# Patient Record
Sex: FEMALE | Race: BLACK OR AFRICAN AMERICAN | NOT HISPANIC OR LATINO | Employment: UNEMPLOYED | URBAN - METROPOLITAN AREA
[De-identification: names, ages, dates, MRNs, and addresses within clinical notes are randomized per-mention and may not be internally consistent; named-entity substitution may affect disease eponyms.]

---

## 2020-08-21 ENCOUNTER — HOSPITAL ENCOUNTER (OUTPATIENT)
Dept: LABOR AND DELIVERY | Facility: HOSPITAL | Age: 20
Discharge: HOME | End: 2020-08-21
Attending: OBSTETRICS & GYNECOLOGY

## 2020-08-21 ENCOUNTER — HOSPITAL ENCOUNTER (OUTPATIENT)
Dept: PREADMISSION TESTING | Facility: HOSPITAL | Age: 20
Discharge: HOME | End: 2020-08-21
Attending: OBSTETRICS & GYNECOLOGY

## 2020-08-21 LAB — CONV ALPHA-1-MICROGLOBULIN PLACENTAL (VAGINAL FLUID): NEGATIVE

## 2020-08-22 LAB — SARS-COV-2 RNA SPEC QL NAA+PROBE: NOT DETECTED

## 2020-10-02 LAB — HM PAP SMEAR: NORMAL

## 2021-07-18 PROCEDURE — U0003 INFECTIOUS AGENT DETECTION BY NUCLEIC ACID (DNA OR RNA); SEVERE ACUTE RESPIRATORY SYNDROME CORONAVIRUS 2 (SARS-COV-2) (CORONAVIRUS DISEASE [COVID-19]), AMPLIFIED PROBE TECHNIQUE, MAKING USE OF HIGH THROUGHPUT TECHNOLOGIES AS DESCRIBED BY CMS-2020-01-R: HCPCS | Performed by: FAMILY MEDICINE

## 2021-07-20 VITALS — HEART RATE: 104 BPM | SYSTOLIC BLOOD PRESSURE: 129 MMHG | WEIGHT: 209.5 LBS | DIASTOLIC BLOOD PRESSURE: 78 MMHG

## 2021-07-21 ENCOUNTER — TELEPHONE (OUTPATIENT)
Dept: URGENT CARE | Facility: CLINIC | Age: 21
End: 2021-07-21

## 2021-07-21 NOTE — TELEPHONE ENCOUNTER
Attempted telephone call to the patient. Left voicemail requesting call back. The patient is positive for Covid.      12:30 PM patient called back.  She is informed of her test results.  Having mild symptoms, but she is pregnant.  Plan: She will call her obstetrician today for assessment.  Return to [facility] if any problems.

## 2021-08-09 ENCOUNTER — OFFICE VISIT (OUTPATIENT)
Dept: OBSTETRICS AND GYNECOLOGY | Facility: CLINIC | Age: 21
End: 2021-08-09

## 2021-08-09 VITALS
HEIGHT: 61 IN | BODY MASS INDEX: 37.53 KG/M2 | SYSTOLIC BLOOD PRESSURE: 116 MMHG | WEIGHT: 198.8 LBS | TEMPERATURE: 97.2 F | DIASTOLIC BLOOD PRESSURE: 80 MMHG

## 2021-08-09 DIAGNOSIS — Z32.00 ENCOUNTER FOR PREGNANCY TEST, RESULT UNKNOWN: Primary | ICD-10-CM

## 2021-08-09 LAB
B-HCG UR QL: POSITIVE
INTERNAL NEGATIVE CONTROL: POSITIVE
INTERNAL POSITIVE CONTROL: POSITIVE
Lab: ABNORMAL

## 2021-08-09 PROCEDURE — 99213 OFFICE O/P EST LOW 20 MIN: CPT | Performed by: NURSE PRACTITIONER

## 2021-08-09 PROCEDURE — 81025 URINE PREGNANCY TEST: CPT | Performed by: NURSE PRACTITIONER

## 2021-08-09 NOTE — PROGRESS NOTES
Subjective   Meliton Pepe is a 21 y.o. female here for pregnancy confirmation.      History of Present Illness LMP 5/9/21, mild nausea. Cycle was regular before she missed it, not using birth control.       13w1d, EDC 2/13/21    Review of Systems   Constitutional: Negative.    HENT: Negative.    Eyes: Negative.    Respiratory: Negative for cough and shortness of breath.    Cardiovascular: Negative for chest pain and leg swelling.   Gastrointestinal: Negative for abdominal pain.   Endocrine: Negative.    Genitourinary: Negative for breast discharge, breast lump, breast pain, difficulty urinating, dyspareunia, dysuria, menstrual problem, pelvic pain and vaginal discharge.   Musculoskeletal: Negative.    Skin: Negative.    Allergic/Immunologic:        No new allergies.   Neurological: Negative.    Hematological: Negative.    Psychiatric/Behavioral: Negative for depressed mood. The patient is not nervous/anxious.        Objective   Physical Exam - Unable to auscultate T      Assessment/Plan   Diagnoses and all orders for this visit:    1. Encounter for pregnancy test, result unknown (Primary)  -     POC Pregnancy, Urine  -     US Ob < 14 Weeks Single or First Gestation; Future  -     Urine Culture - Urine, Urine, Clean Catch  -     Hepatitis C Antibody  -     CBC & Differential  -     Hepatitis B Surface Antigen  -     HIV-1 / O / 2 Ag / Antibody 4th Generation  -     RPR  -     Type & Screen  -     Urinalysis With Microscopic - Urine, Clean Catch  -     Vitamin D 25 Hydroxy  -     Rubella Antibody, IgG    Other orders  -     Prenatal MV-Min-Fe Fum-FA-DHA (Prenatal Multi +DHA) 27-0.8-200 MG capsule; Take 1 tablet by mouth Daily.  Dispense: 90 capsule; Refill: 4      Return in four weeks.

## 2021-08-20 ENCOUNTER — HOSPITAL ENCOUNTER (OUTPATIENT)
Dept: ULTRASOUND IMAGING | Facility: HOSPITAL | Age: 21
Discharge: HOME | End: 2021-08-20
Admitting: NURSE PRACTITIONER

## 2021-08-20 DIAGNOSIS — Z32.00 ENCOUNTER FOR PREGNANCY TEST, RESULT UNKNOWN: ICD-10-CM

## 2021-08-20 PROCEDURE — 76801 OB US < 14 WKS SINGLE FETUS: CPT

## 2021-09-17 ENCOUNTER — INITIAL PRENATAL (OUTPATIENT)
Dept: OBSTETRICS AND GYNECOLOGY | Facility: CLINIC | Age: 21
End: 2021-09-17

## 2021-09-17 VITALS — BODY MASS INDEX: 38.73 KG/M2 | SYSTOLIC BLOOD PRESSURE: 110 MMHG | WEIGHT: 205 LBS | DIASTOLIC BLOOD PRESSURE: 64 MMHG

## 2021-09-17 DIAGNOSIS — Z98.891 PREVIOUS CESAREAN SECTION: ICD-10-CM

## 2021-09-17 DIAGNOSIS — N91.2 AMENORRHEA: Primary | ICD-10-CM

## 2021-09-17 DIAGNOSIS — Z34.80 SUPERVISION OF OTHER NORMAL PREGNANCY, ANTEPARTUM: ICD-10-CM

## 2021-09-17 LAB
ABO GROUP BLD: NORMAL
B-HCG UR QL: POSITIVE
BASOPHILS # BLD AUTO: 0.02 10*3/MM3 (ref 0–0.2)
BASOPHILS NFR BLD AUTO: 0.1 % (ref 0–1.5)
BLD GP AB SCN SERPL QL: NEGATIVE
C TRACH RRNA SPEC DONR QL NAA+PROBE: NORMAL
DEPRECATED RDW RBC AUTO: 39.4 FL (ref 37–54)
EOSINOPHIL # BLD AUTO: 0.01 10*3/MM3 (ref 0–0.4)
EOSINOPHIL NFR BLD AUTO: 0.1 % (ref 0.3–6.2)
ERYTHROCYTE [DISTWIDTH] IN BLOOD BY AUTOMATED COUNT: 13.8 % (ref 12.3–15.4)
GLUCOSE UR STRIP-MCNC: NEGATIVE MG/DL
HBV SURFACE AG SERPL QL IA: NORMAL
HCT VFR BLD AUTO: 34.8 % (ref 34–46.6)
HCV AB SER DONR QL: NORMAL
HGB BLD-MCNC: 11.4 G/DL (ref 12–15.9)
IMM GRANULOCYTES # BLD AUTO: 0.06 10*3/MM3 (ref 0–0.05)
IMM GRANULOCYTES NFR BLD AUTO: 0.4 % (ref 0–0.5)
INTERNAL NEGATIVE CONTROL: ABNORMAL
INTERNAL POSITIVE CONTROL: POSITIVE
LYMPHOCYTES # BLD AUTO: 2.44 10*3/MM3 (ref 0.7–3.1)
LYMPHOCYTES NFR BLD AUTO: 18.2 % (ref 19.6–45.3)
Lab: ABNORMAL
MCH RBC QN AUTO: 26 PG (ref 26.6–33)
MCHC RBC AUTO-ENTMCNC: 32.8 G/DL (ref 31.5–35.7)
MCV RBC AUTO: 79.3 FL (ref 79–97)
MONOCYTES # BLD AUTO: 1.14 10*3/MM3 (ref 0.1–0.9)
MONOCYTES NFR BLD AUTO: 8.5 % (ref 5–12)
N GONORRHOEA DNA SPEC QL NAA+PROBE: NORMAL
NEUTROPHILS NFR BLD AUTO: 72.7 % (ref 42.7–76)
NEUTROPHILS NFR BLD AUTO: 9.72 10*3/MM3 (ref 1.7–7)
NRBC BLD AUTO-RTO: 0 /100 WBC (ref 0–0.2)
PLATELET # BLD AUTO: 258 10*3/MM3 (ref 140–450)
PMV BLD AUTO: 10.9 FL (ref 6–12)
PROT UR STRIP-MCNC: NEGATIVE MG/DL
RBC # BLD AUTO: 4.39 10*6/MM3 (ref 3.77–5.28)
RH BLD: POSITIVE
WBC # BLD AUTO: 13.39 10*3/MM3 (ref 3.4–10.8)

## 2021-09-17 PROCEDURE — 87086 URINE CULTURE/COLONY COUNT: CPT | Performed by: ADVANCED PRACTICE MIDWIFE

## 2021-09-17 PROCEDURE — 99214 OFFICE O/P EST MOD 30 MIN: CPT | Performed by: ADVANCED PRACTICE MIDWIFE

## 2021-09-17 PROCEDURE — G0432 EIA HIV-1/HIV-2 SCREEN: HCPCS | Performed by: ADVANCED PRACTICE MIDWIFE

## 2021-09-17 PROCEDURE — 88175 CYTOPATH C/V AUTO FLUID REDO: CPT

## 2021-09-17 PROCEDURE — 87624 HPV HI-RISK TYP POOLED RSLT: CPT | Performed by: ADVANCED PRACTICE MIDWIFE

## 2021-09-17 PROCEDURE — 87591 N.GONORRHOEAE DNA AMP PROB: CPT

## 2021-09-17 PROCEDURE — 87661 TRICHOMONAS VAGINALIS AMPLIF: CPT

## 2021-09-17 PROCEDURE — 87491 CHLMYD TRACH DNA AMP PROBE: CPT

## 2021-09-17 PROCEDURE — 86803 HEPATITIS C AB TEST: CPT | Performed by: ADVANCED PRACTICE MIDWIFE

## 2021-09-17 PROCEDURE — 81025 URINE PREGNANCY TEST: CPT | Performed by: ADVANCED PRACTICE MIDWIFE

## 2021-09-17 NOTE — PROGRESS NOTES
OB Initial Visit    CC- Here for care of current pregnancy     PINEDA Finalized: Due date finalized: 21    Subjective:  21 y.o.  presenting for her first obstetrical visit.    LMP: Patient's last menstrual period was 2021 (exact date). sure    COMPLAINS OF: Pt has no complaints, is doing well        Reviewed and updated:  OBHx, GYNHx (STDs), PMHx, Medications, Allergies, PSHx, Social Hx, Preventative Hx (PAP), Hx of abuse/safe environment, Vaccine Hx including hx of chickenpox or vaccine, Genetic Hx (pt, FOB, both families).        Objective:  /64   Wt 93 kg (205 lb)   LMP 2021 (Exact Date)   BMI 38.73 kg/m²   General- NAD, alert and oriented, appropriate  Psych- Normal mood, good memory  Neck- No masses, no thyroid enlargement  CV- Regular rhythm, no murnurs  Resp- CTA to bases, no wheezes  Abdomen- Soft, non distended, non tender, no masses     Breast left- No mass, non tender, no nipple discharge  Breast right- No mass, non tender, no nipple discharge    External genitalia- Normal, no lesions  Urethra- Normal, no masses, non tender  Vagina- Normal, no discharge  Bladder- Normal, no masses, non tender  Cvx- Normal, no lesions, no discharge, no CMT  Uterus- Consistent with dates  Adnexa- Normal, no mass, non tender    Lymphatic- No palpable neck, axillary, or groin nodes  Ext- No edema, no cyanosis    Skin- No lesions, no rashes, no acanthosis nigricans      Assessment and Plan:  Diagnoses and all orders for this visit:    1. Amenorrhea (Primary)  -     POC Pregnancy, Urine    2. Supervision of other normal pregnancy, antepartum  -     POC Urinalysis Dipstick  -     OB Panel With HIV  -     IGP,CtNgTv,rfx Aptima HPV ASCU  -     Urine Culture - Urine, Urine, Clean Catch  -     US Ob Detail Fetal Anatomy Single or First Gestation; Future    3. Previous  section            18w5d    Genetic Screening: Declines    Counseling: Nutrition discussed, calories, activity/exercise in  pregnancy, Discussed dietary restrictions/safety food preparation in pregnancy, Reviewed what to expect prenatal visits, office providers, Appropriate trimester precautions provided, N/V, vag bleeding, cramping, Questions answered    Labs: Prenatal labs, cultures, and PAP performed (prn)    Return in about 2 weeks (around 10/1/2021) for Anatomy /S.      Roxi Thompson CNM  09/17/2021

## 2021-09-18 LAB
BACTERIA SPEC AEROBE CULT: NO GROWTH
HIV1+2 AB SER QL: NORMAL
RPR SER QL: NORMAL
RUBV IGG SERPL IA-ACNC: 16.1 INDEX

## 2021-09-20 ENCOUNTER — TELEPHONE (OUTPATIENT)
Dept: LABOR AND DELIVERY | Facility: HOSPITAL | Age: 21
End: 2021-09-20

## 2021-09-24 LAB
C TRACH RRNA CVX QL NAA+PROBE: NEGATIVE
CONV .: ABNORMAL
CYTOLOGIST CVX/VAG CYTO: ABNORMAL
CYTOLOGY CVX/VAG DOC CYTO: ABNORMAL
CYTOLOGY CVX/VAG DOC THIN PREP: ABNORMAL
DX ICD CODE: ABNORMAL
DX ICD CODE: ABNORMAL
HIV 1 & 2 AB SER-IMP: ABNORMAL
HPV I/H RISK 4 DNA CVX QL PROBE+SIG AMP: POSITIVE
N GONORRHOEA RRNA CVX QL NAA+PROBE: NEGATIVE
OTHER STN SPEC: ABNORMAL
PATHOLOGIST CVX/VAG CYTO: ABNORMAL
STAT OF ADQ CVX/VAG CYTO-IMP: ABNORMAL
T VAGINALIS RRNA SPEC QL NAA+PROBE: NEGATIVE

## 2021-09-30 PROBLEM — Z34.80 SUPERVISION OF OTHER NORMAL PREGNANCY, ANTEPARTUM: Status: ACTIVE | Noted: 2021-09-30

## 2021-10-06 ENCOUNTER — DOCUMENTATION (OUTPATIENT)
Dept: OBSTETRICS AND GYNECOLOGY | Facility: CLINIC | Age: 21
End: 2021-10-06

## 2021-10-15 ENCOUNTER — TELEPHONE (OUTPATIENT)
Dept: OBSTETRICS AND GYNECOLOGY | Facility: CLINIC | Age: 21
End: 2021-10-15

## 2021-10-15 ENCOUNTER — HOSPITAL ENCOUNTER (OUTPATIENT)
Dept: ULTRASOUND IMAGING | Facility: HOSPITAL | Age: 21
Discharge: HOME | End: 2021-10-15
Admitting: ADVANCED PRACTICE MIDWIFE

## 2021-10-15 DIAGNOSIS — Z34.80 SUPERVISION OF OTHER NORMAL PREGNANCY, ANTEPARTUM: ICD-10-CM

## 2021-10-15 PROCEDURE — 76811 OB US DETAILED SNGL FETUS: CPT

## 2021-10-20 ENCOUNTER — TELEPHONE (OUTPATIENT)
Dept: LABOR AND DELIVERY | Facility: HOSPITAL | Age: 21
End: 2021-10-20

## 2021-10-20 NOTE — TELEPHONE ENCOUNTER
Called patient to introduce Mother[facility] Program and offered participation. Patient declined.

## 2021-11-03 ENCOUNTER — PREP FOR SURGERY (OUTPATIENT)
Dept: OTHER | Facility: HOSPITAL | Age: 21
End: 2021-11-03

## 2021-11-03 ENCOUNTER — ROUTINE PRENATAL (OUTPATIENT)
Dept: OBSTETRICS AND GYNECOLOGY | Facility: CLINIC | Age: 21
End: 2021-11-03

## 2021-11-03 VITALS — WEIGHT: 217.6 LBS | SYSTOLIC BLOOD PRESSURE: 114 MMHG | DIASTOLIC BLOOD PRESSURE: 75 MMHG | BODY MASS INDEX: 41.12 KG/M2

## 2021-11-03 DIAGNOSIS — O34.219 PREVIOUS CESAREAN DELIVERY, ANTEPARTUM: Primary | ICD-10-CM

## 2021-11-03 DIAGNOSIS — Z34.80 SUPERVISION OF OTHER NORMAL PREGNANCY, ANTEPARTUM: ICD-10-CM

## 2021-11-03 LAB
GLUCOSE UR STRIP-MCNC: NEGATIVE MG/DL
RBC # UR STRIP: NEGATIVE /UL

## 2021-11-03 PROCEDURE — 99213 OFFICE O/P EST LOW 20 MIN: CPT | Performed by: OBSTETRICS & GYNECOLOGY

## 2021-11-03 NOTE — PROGRESS NOTES
OB FOLLOW UP    CC: Scheduled OB routine FU       Prenatal care complicated by:   Patient Active Problem List   Diagnosis   • Supervision of other normal pregnancy, antepartum   • Previous  delivery, antepartum   • Obesity affecting pregnancy in second trimester       Subjective:   Patient has: No complaints, No leaking fluid, No vaginal bleeding, No contractions, Adequate FM    Objective:  Urine glucose/protein- see flow sheet      /75   Wt 98.7 kg (217 lb 9.6 oz)   LMP 2021 (Exact Date)   BMI 41.12 kg/m²   See OB flow for LE edema, and cvx exam if applicable  FHT: 147 BPM   Uterine Size: 26 cm      Assessment and Plan:  Diagnoses and all orders for this visit:    1. Previous  delivery, antepartum (Primary)  Overview:  Repeat c/s 2/10/22      2. Supervision of other normal pregnancy, antepartum  Overview:  EDC 21    Initial prenatal care at [facility]    Previous  delivery-repeat  delivery 2/10/2022   obesity    Covid vaccine-recommended  Flu vaccine-recommended  Tdap vaccine-    Assessment & Plan:  Continue prenatal vitamins  The patient reports that she cannot stay for her Glucola today.  She wishes to do this at the next office visit.  Discussed we should try to do this prior to 28 weeks of gestation.  Discussed need to choose a pediatrician for the baby.  Fetal kick counts    Orders:  -     POC Urinalysis Dipstick        25w3d  Reassuring pregnancy progress    Counseling: OB precautions, leaking, VB, curry bermeo vs PTL/Labor  FKC    Questions answered    Return in about 2 weeks (around 2021) for Recheck.      Janak Gross MD  2021

## 2021-11-04 ENCOUNTER — TELEPHONE (OUTPATIENT)
Dept: OBSTETRICS AND GYNECOLOGY | Facility: CLINIC | Age: 21
End: 2021-11-04

## 2021-11-05 PROBLEM — O99.212 OBESITY AFFECTING PREGNANCY IN SECOND TRIMESTER: Status: ACTIVE | Noted: 2021-11-05

## 2021-11-05 NOTE — ASSESSMENT & PLAN NOTE
Continue prenatal vitamins  The patient reports that she cannot stay for her Glucola today.  She wishes to do this at the next office visit.  Discussed we should try to do this prior to 28 weeks of gestation.  Discussed need to choose a pediatrician for the baby.  Fetal kick counts

## 2021-11-18 ENCOUNTER — ROUTINE PRENATAL (OUTPATIENT)
Dept: OBSTETRICS AND GYNECOLOGY | Facility: CLINIC | Age: 21
End: 2021-11-18

## 2021-11-18 VITALS — BODY MASS INDEX: 40.7 KG/M2 | SYSTOLIC BLOOD PRESSURE: 135 MMHG | WEIGHT: 215.4 LBS | DIASTOLIC BLOOD PRESSURE: 64 MMHG

## 2021-11-18 DIAGNOSIS — Z34.80 SUPERVISION OF OTHER NORMAL PREGNANCY, ANTEPARTUM: Primary | ICD-10-CM

## 2021-11-18 LAB
25(OH)D3 SERPL-MCNC: 28.1 NG/ML (ref 30–100)
ABO GROUP BLD: NORMAL
BLD GP AB SCN SERPL QL: NEGATIVE
DEPRECATED RDW RBC AUTO: 36.6 FL (ref 37–54)
ERYTHROCYTE [DISTWIDTH] IN BLOOD BY AUTOMATED COUNT: 13.3 % (ref 12.3–15.4)
GLUCOSE 1H P GLC SERPL-MCNC: 152 MG/DL (ref 65–139)
GLUCOSE UR STRIP-MCNC: NEGATIVE MG/DL
HBV SURFACE AG SERPL QL IA: NORMAL
HCT VFR BLD AUTO: 35.7 % (ref 34–46.6)
HCV AB SER DONR QL: NORMAL
HGB BLD-MCNC: 11.7 G/DL (ref 12–15.9)
HIV1+2 AB SER QL: NORMAL
MCH RBC QN AUTO: 25.2 PG (ref 26.6–33)
MCHC RBC AUTO-ENTMCNC: 32.8 G/DL (ref 31.5–35.7)
MCV RBC AUTO: 76.9 FL (ref 79–97)
PLATELET # BLD AUTO: 231 10*3/MM3 (ref 140–450)
PMV BLD AUTO: 12.2 FL (ref 6–12)
PROT UR STRIP-MCNC: NEGATIVE MG/DL
RBC # BLD AUTO: 4.64 10*6/MM3 (ref 3.77–5.28)
RH BLD: POSITIVE
T&S EXPIRATION DATE: NORMAL
WBC NRBC COR # BLD: 12.15 10*3/MM3 (ref 3.4–10.8)

## 2021-11-18 PROCEDURE — 82306 VITAMIN D 25 HYDROXY: CPT | Performed by: NURSE PRACTITIONER

## 2021-11-18 PROCEDURE — 86850 RBC ANTIBODY SCREEN: CPT | Performed by: NURSE PRACTITIONER

## 2021-11-18 PROCEDURE — 85027 COMPLETE CBC AUTOMATED: CPT | Performed by: OBSTETRICS & GYNECOLOGY

## 2021-11-18 PROCEDURE — 86803 HEPATITIS C AB TEST: CPT | Performed by: NURSE PRACTITIONER

## 2021-11-18 PROCEDURE — 82950 GLUCOSE TEST: CPT | Performed by: OBSTETRICS & GYNECOLOGY

## 2021-11-18 PROCEDURE — 86901 BLOOD TYPING SEROLOGIC RH(D): CPT | Performed by: NURSE PRACTITIONER

## 2021-11-18 PROCEDURE — 99214 OFFICE O/P EST MOD 30 MIN: CPT | Performed by: OBSTETRICS & GYNECOLOGY

## 2021-11-18 PROCEDURE — 86592 SYPHILIS TEST NON-TREP QUAL: CPT | Performed by: NURSE PRACTITIONER

## 2021-11-18 PROCEDURE — 87340 HEPATITIS B SURFACE AG IA: CPT | Performed by: NURSE PRACTITIONER

## 2021-11-18 PROCEDURE — G0432 EIA HIV-1/HIV-2 SCREEN: HCPCS | Performed by: NURSE PRACTITIONER

## 2021-11-18 PROCEDURE — 86900 BLOOD TYPING SEROLOGIC ABO: CPT | Performed by: NURSE PRACTITIONER

## 2021-11-18 PROCEDURE — 86762 RUBELLA ANTIBODY: CPT | Performed by: NURSE PRACTITIONER

## 2021-11-19 ENCOUNTER — TELEPHONE (OUTPATIENT)
Dept: OBSTETRICS AND GYNECOLOGY | Facility: CLINIC | Age: 21
End: 2021-11-19

## 2021-11-19 LAB
RPR SER QL: NORMAL
RUBV IGG SERPL IA-ACNC: 14.2 INDEX

## 2021-11-22 ENCOUNTER — TELEPHONE (OUTPATIENT)
Dept: OBSTETRICS AND GYNECOLOGY | Facility: CLINIC | Age: 21
End: 2021-11-22

## 2021-11-30 ENCOUNTER — ROUTINE PRENATAL (OUTPATIENT)
Dept: OBSTETRICS AND GYNECOLOGY | Facility: CLINIC | Age: 21
End: 2021-11-30

## 2021-11-30 VITALS — WEIGHT: 222 LBS | BODY MASS INDEX: 41.95 KG/M2 | SYSTOLIC BLOOD PRESSURE: 103 MMHG | DIASTOLIC BLOOD PRESSURE: 69 MMHG

## 2021-11-30 DIAGNOSIS — O26.849 FETAL SIZE INCONSISTENT WITH DATES: ICD-10-CM

## 2021-11-30 DIAGNOSIS — O99.212 OBESITY AFFECTING PREGNANCY IN SECOND TRIMESTER: ICD-10-CM

## 2021-11-30 DIAGNOSIS — O34.219 PREVIOUS CESAREAN DELIVERY, ANTEPARTUM: ICD-10-CM

## 2021-11-30 DIAGNOSIS — Z34.80 SUPERVISION OF OTHER NORMAL PREGNANCY, ANTEPARTUM: Primary | ICD-10-CM

## 2021-11-30 LAB
GLUCOSE UR STRIP-MCNC: NEGATIVE MG/DL
PROT UR STRIP-MCNC: NEGATIVE MG/DL

## 2021-11-30 PROCEDURE — 99213 OFFICE O/P EST LOW 20 MIN: CPT | Performed by: OBSTETRICS & GYNECOLOGY

## 2021-12-02 PROBLEM — O26.849 FETAL SIZE INCONSISTENT WITH DATES: Status: ACTIVE | Noted: 2021-12-02

## 2021-12-02 NOTE — ASSESSMENT & PLAN NOTE
Continue prenatal vitamins  Fetal kick counts   labor warnings  1 hour GTT was 152.  Needs 3-hour GTT ASAP

## 2021-12-07 LAB
GLUCOSE 1H P 100 G GLC PO SERPL-MCNC: 130 MG/DL
GLUCOSE 2H P 100 G GLC PO SERPL-MCNC: 120 MG/DL
GLUCOSE 3H P 100 G GLC PO SERPL-MCNC: 124 MG/DL
GLUCOSE P FAST SERPL-MCNC: 89 MG/DL

## 2021-12-09 ENCOUNTER — ROUTINE PRENATAL (OUTPATIENT)
Dept: OBSTETRICS AND GYNECOLOGY | Facility: CLINIC | Age: 21
End: 2021-12-09

## 2021-12-09 ENCOUNTER — TELEPHONE (OUTPATIENT)
Dept: OBSTETRICS AND GYNECOLOGY | Facility: CLINIC | Age: 21
End: 2021-12-09

## 2021-12-09 VITALS — DIASTOLIC BLOOD PRESSURE: 62 MMHG | WEIGHT: 225 LBS | SYSTOLIC BLOOD PRESSURE: 109 MMHG | BODY MASS INDEX: 42.51 KG/M2

## 2021-12-09 DIAGNOSIS — Z34.80 SUPERVISION OF OTHER NORMAL PREGNANCY, ANTEPARTUM: Primary | ICD-10-CM

## 2021-12-09 DIAGNOSIS — O26.849 FETAL SIZE INCONSISTENT WITH DATES: ICD-10-CM

## 2021-12-09 DIAGNOSIS — O99.212 OBESITY AFFECTING PREGNANCY IN SECOND TRIMESTER: ICD-10-CM

## 2021-12-09 DIAGNOSIS — O34.219 PREVIOUS CESAREAN DELIVERY, ANTEPARTUM: ICD-10-CM

## 2021-12-09 LAB
GLUCOSE UR STRIP-MCNC: NEGATIVE MG/DL
PROT UR STRIP-MCNC: NEGATIVE MG/DL

## 2021-12-09 PROCEDURE — 99213 OFFICE O/P EST LOW 20 MIN: CPT | Performed by: OBSTETRICS & GYNECOLOGY

## 2021-12-09 NOTE — PROGRESS NOTES
OB FOLLOW UP    CC: Scheduled OB routine FU     Prenatal care complicated by:   Patient Active Problem List   Diagnosis   • Supervision of other normal pregnancy, antepartum   • Previous  delivery, antepartum   • Obesity affecting pregnancy in second trimester   • Fetal size inconsistent with dates       Subjective:   Patient has: No complaints, No leaking fluid, No vaginal bleeding, No contractions, Adequate FM    Objective:  Urine glucose/protein- see flow sheet      /62   Wt 102 kg (225 lb)   LMP 2021 (Exact Date)   BMI 42.51 kg/m²   See OB flow for LE edema, and cvx exam if applicable  FHT: 141 BPM   Uterine Size: 33 cm      Assessment and Plan:  Diagnoses and all orders for this visit:    1. Supervision of other normal pregnancy, antepartum (Primary)  Overview:  EDC 22 by LMP and 14week US    Initial prenatal care at [facility]  Previous  delivery  Size greater than dates  ASCUS Pap HPV positive    Covid vaccine-recommended  Flu vaccine-recommended  Tdap vaccine-2021 recommended    Assessment & Plan:  Continue prenatal vitamins  Fetal kick counts   labor warnings    Orders:  -     POC Urinalysis Dipstick    2. Previous  delivery, antepartum  Overview:  Repeat c/s 2/10/22      3. Obesity affecting pregnancy in second trimester  Overview:  NSTs at 36 weeks    Assessment & Plan:  Discussed nutrition, exercise and appropriate weight gain in pregnancy      4. Fetal size inconsistent with dates  Overview:  Growth ultrasound scheduled      30w4d  Reassuring pregnancy progress    Counseling: OB precautions, leaking, VB, curry bermeo vs PTL/Labor  FKC    Questions answered    Return in about 2 weeks (around 2021) for Recheck.      Janak Gross MD  2021

## 2021-12-23 ENCOUNTER — ROUTINE PRENATAL (OUTPATIENT)
Dept: OBSTETRICS AND GYNECOLOGY | Facility: CLINIC | Age: 21
End: 2021-12-23

## 2021-12-23 VITALS — WEIGHT: 226 LBS | SYSTOLIC BLOOD PRESSURE: 129 MMHG | DIASTOLIC BLOOD PRESSURE: 72 MMHG | BODY MASS INDEX: 42.7 KG/M2

## 2021-12-23 DIAGNOSIS — Z34.80 SUPERVISION OF OTHER NORMAL PREGNANCY, ANTEPARTUM: Primary | ICD-10-CM

## 2021-12-23 LAB
GLUCOSE UR STRIP-MCNC: NEGATIVE MG/DL
PROT UR STRIP-MCNC: NEGATIVE MG/DL

## 2021-12-23 PROCEDURE — 99213 OFFICE O/P EST LOW 20 MIN: CPT | Performed by: STUDENT IN AN ORGANIZED HEALTH CARE EDUCATION/TRAINING PROGRAM

## 2021-12-23 NOTE — PROGRESS NOTES
OB FOLLOW UP  Complaint   Chief Complaint   Patient presents with   • Routine Prenatal Visit            Meliton Pepe is a 21 y.o.  32w4d patient being seen today for her obstetrical follow up visit. Patient denies decreased fetal movement, contractions, loss of fluid or vaginal bleeding.  Patient denies any headache, visual disturbances, new onset nausea vomiting, right upper quadrant pain, or new onset swelling.  Has not completed her Tdap.  Has not gotten Covid vaccinated or flu shot.      Her prenatal care is complicated by (and status) :    Patient Active Problem List   Diagnosis   • Supervision of other normal pregnancy, antepartum   • Previous  delivery, antepartum   • Obesity affecting pregnancy in second trimester   • Fetal size inconsistent with dates       All other systems reviewed and are negative.     The additional following portions of the patient's history were reviewed and updated as appropriate: allergies, current medications, past family history, past medical history, past social history, past surgical history and problem list.      EXAM:     Vital signs: /72   Wt 103 kg (226 lb)   LMP 2021 (Exact Date)   BMI 42.70 kg/m²   Appearance/psychiatric: To be in no distress  Constitutional: The patient is well nourished.  Cardiovascular: She does not have edema.  Respiratory: Respiratory effort is normal.  Gastrointestinal: Abdomen is soft, gravid, nontender, no rashes, heart tones are present, fundal height is size equals dates    Pelvic Exam: No    Urine glucose/protein: See prenatal flowsheet       Assessment and Plan    Problem List Items Addressed This Visit        Gravid and     Supervision of other normal pregnancy, antepartum - Primary    Overview     EDC 22 by LMP and 14week US    Initial prenatal care at [facility]  Previous  delivery  Size greater than dates  ASCUS Pap HPV positive    Covid vaccine-recommended  Flu  vaccine-recommended  Tdap vaccine-11/18/2021 - recommendation 28-36 weeks.          Relevant Orders    POC Urinalysis Dipstick (Completed)          Impression  1. Pregnancy at 32w4d  2. Fetal status reassuring.   3. Activity and Exercise discussed.    Plan  1.  Continue with routine prenatal care follow-up in 2 weeks  2.  Recommendation for Tdap to be performed  3.  Recommendation for Covid vaccination as well as flu vaccination  4.  Continue  with prenatal vitamin      Patient was counseled to the following pregnancy precautions:  • Decreased fetal movement, if concern for decreased fetal movement please perform fetal kick counts you are looking for 10 movements in 2 hours.  If concern for fetal movement and not meeting that criteria, please present to triage for evaluation.  • Contractions occurring every 5 minutes for over an hour, lasting 30 to 60 seconds and progressively causing more discomfort, please seek medical attention to rule out labor  • If you believe that your water is broken, place a sanitary pad.  If pad fills in short period of time i.e. less than 5 minutes, take off pad placed another pad.  If this is saturated please present for rule out rupture of membranes  • Vaginal bleeding can be normal in pregnancy, this usually takes a form of spotting.  If having heavier bleeding like a menstrual period please present for evaluation; especially in light of severe abdominal pain this could represent a placental abruption.  • Keep all scheduled appointments as recommended.        Jono Gramajo MD  12/23/2021

## 2021-12-29 ENCOUNTER — ROUTINE PRENATAL (OUTPATIENT)
Dept: OBSTETRICS AND GYNECOLOGY | Facility: CLINIC | Age: 21
End: 2021-12-29

## 2021-12-29 VITALS — WEIGHT: 225 LBS | DIASTOLIC BLOOD PRESSURE: 66 MMHG | SYSTOLIC BLOOD PRESSURE: 95 MMHG | BODY MASS INDEX: 42.51 KG/M2

## 2021-12-29 DIAGNOSIS — Z34.80 SUPERVISION OF OTHER NORMAL PREGNANCY, ANTEPARTUM: Primary | ICD-10-CM

## 2021-12-29 DIAGNOSIS — O34.219 PREVIOUS CESAREAN DELIVERY, ANTEPARTUM: ICD-10-CM

## 2021-12-29 DIAGNOSIS — O26.849 FETAL SIZE INCONSISTENT WITH DATES: ICD-10-CM

## 2021-12-29 DIAGNOSIS — O99.212 OBESITY AFFECTING PREGNANCY IN SECOND TRIMESTER: ICD-10-CM

## 2021-12-29 LAB
GLUCOSE UR STRIP-MCNC: NEGATIVE MG/DL
PROT UR STRIP-MCNC: NEGATIVE MG/DL

## 2021-12-29 PROCEDURE — 99213 OFFICE O/P EST LOW 20 MIN: CPT | Performed by: OBSTETRICS & GYNECOLOGY

## 2021-12-29 NOTE — PROGRESS NOTES
OB FOLLOW UP    CC: Scheduled OB routine FU     Prenatal care complicated by:   Patient Active Problem List   Diagnosis   • Supervision of other normal pregnancy, antepartum   • Previous  delivery, antepartum   • Obesity affecting pregnancy in second trimester   • Fetal size inconsistent with dates       Subjective:   Patient has: No complaints, No leaking fluid, No vaginal bleeding, No contractions, Adequate FM    Objective:  Urine glucose/protein- see flow sheet      BP 95/66   Wt 102 kg (225 lb)   LMP 2021 (Exact Date)   BMI 42.51 kg/m²   See OB flow for LE edema, and cvx exam if applicable  FHT: 149 BPM   Uterine Size: 33 cm    Ultrasound 2021: Indication: Fetal growth.  Comparisons: None.  There is a vasquez intrauterine gestation.  The estimated fetal weight is 1996 g or 4 pounds 6 ounces.  This is the 40th percentile for gestational age.  The YEE is 15.58 cm.  The fetal heart rate is 149 bpm.  The placenta is posterior/fundal and grade 1.  There is no evidence of placenta previa.  A three-vessel cord was demonstrated.  Fetal profile views appeared normal.  No anatomical abnormalities were noted on today's limited study.  The anatomical survey is limited by gestational age and body habitus.  The study was independently interpreted by me.    Assessment and Plan:  Diagnoses and all orders for this visit:    1. Supervision of other normal pregnancy, antepartum (Primary)  Overview:  EDC 22 by LMP and 14week US    Initial prenatal care at [facility]  Previous  delivery  Size greater than dates  ASCUS Pap HPV positive    Covid vaccine-recommended  Flu vaccine-recommended  Tdap vaccine-2021 - recommendation 28-36 weeks.     Growth ultrasound 2021: 4 pounds 6 ounces 40th percentile    Assessment & Plan:  Continue prenatal vitamins  Fetal kick counts   labor warnings  Reviewed growth ultrasound with patient.  GBS next office visit    Orders:  -     POC  Urinalysis Dipstick    2. Previous  delivery, antepartum  Overview:  Repeat c/s 2/10/22      3. Obesity affecting pregnancy in second trimester  Overview:  NSTs at 36 weeks    Assessment & Plan:  Discussed exercise, nutrition and appropriate weight gain.      4. Fetal size inconsistent with dates  Overview:  Growth on 2021 40th percentile, 4 pounds 6 ounces.          33w3d  Reassuring pregnancy progress    Counseling: OB precautions, leaking, VB, curry bermeo vs PTL/Labor  FKC    Questions answered    Return in about 2 weeks (around 2022).      Janak Gross MD  2021

## 2021-12-30 NOTE — ASSESSMENT & PLAN NOTE
Continue prenatal vitamins  Fetal kick counts   labor warnings  Reviewed growth ultrasound with patient.  GBS next office visit

## 2022-01-10 ENCOUNTER — TELEPHONE (OUTPATIENT)
Dept: OBSTETRICS AND GYNECOLOGY | Facility: CLINIC | Age: 22
End: 2022-01-10

## 2022-01-10 ENCOUNTER — ROUTINE PRENATAL (OUTPATIENT)
Dept: OBSTETRICS AND GYNECOLOGY | Facility: CLINIC | Age: 22
End: 2022-01-10

## 2022-01-10 VITALS — WEIGHT: 229 LBS | BODY MASS INDEX: 43.27 KG/M2 | DIASTOLIC BLOOD PRESSURE: 76 MMHG | SYSTOLIC BLOOD PRESSURE: 111 MMHG

## 2022-01-10 DIAGNOSIS — Z34.80 SUPERVISION OF OTHER NORMAL PREGNANCY, ANTEPARTUM: Primary | ICD-10-CM

## 2022-01-10 DIAGNOSIS — O34.219 PREVIOUS CESAREAN DELIVERY, ANTEPARTUM: ICD-10-CM

## 2022-01-10 DIAGNOSIS — O99.210 OBESITY AFFECTING PREGNANCY, ANTEPARTUM: ICD-10-CM

## 2022-01-10 LAB
GLUCOSE UR STRIP-MCNC: NEGATIVE MG/DL
PROT UR STRIP-MCNC: NEGATIVE MG/DL

## 2022-01-10 PROCEDURE — 99213 OFFICE O/P EST LOW 20 MIN: CPT | Performed by: OBSTETRICS & GYNECOLOGY

## 2022-01-10 NOTE — ASSESSMENT & PLAN NOTE
Discussed exercise, nutrition and appropriate weight gain in pregnancy  Start NSTs next office visit order placed today

## 2022-01-10 NOTE — ASSESSMENT & PLAN NOTE
Continue prenatal vitamins  Fetal kick counts   labor warnings  GBS next office visit  Initial BP was elevated, repeat blood pressure was normal.  Patient has no proteinuria.  Continue to monitor blood pressure closely.  Blood pressure/preeclampsia warnings given

## 2022-01-10 NOTE — PROGRESS NOTES
OB FOLLOW UP    CC: Scheduled OB routine FU     Prenatal care complicated by:   Patient Active Problem List   Diagnosis   • Supervision of other normal pregnancy, antepartum   • Previous  delivery, antepartum   • Obesity affecting pregnancy, antepartum   • Fetal size inconsistent with dates       Subjective:   Patient has: No complaints, No leaking fluid, No vaginal bleeding, No contractions, Adequate FM, No HA, No scotomata or vision changes, No RUQ/epigastric pain      Objective:  Urine glucose/protein- see flow sheet      /76   Wt 104 kg (229 lb)   LMP 2021 (Exact Date)   BMI 43.27 kg/m²   See OB flow for LE edema, and cvx exam if applicable  FHT: 153 BPM   Uterine Size: 36 cm      Assessment and Plan:  Diagnoses and all orders for this visit:    1. Supervision of other normal pregnancy, antepartum (Primary)  Overview:  EDC 22 by LMP and 14week US    Initial prenatal care at [facility]  Previous  delivery  Size greater than dates  ASCUS Pap HPV positive    Covid vaccine-recommended  Flu vaccine-recommended  Tdap vaccine-2021 - recommendation 28-36 weeks.     Growth ultrasound 2021: 4 pounds 6 ounces 40th percentile    Assessment & Plan:  Continue prenatal vitamins  Fetal kick counts   labor warnings  GBS next office visit  Initial BP was elevated, repeat blood pressure was normal.  Patient has no proteinuria.  Continue to monitor blood pressure closely.  Blood pressure/preeclampsia warnings given    Orders:  -     POC Urinalysis Dipstick    2. Previous  delivery, antepartum  Overview:  Repeat c/s 2/10/22      3. Obesity affecting pregnancy, antepartum  Overview:  NSTs at 36 weeks -order done 1/10/2022    Assessment & Plan:  Discussed exercise, nutrition and appropriate weight gain in pregnancy  Start NSTs next office visit order placed today    Orders:  -     Fetal Nonstress Test; Standing        35w1d  Reassuring pregnancy  progress    Counseling: OB precautions, leaking, VB, curry bermeo vs PTL/Labor  FKC    Questions answered    Return in about 1 week (around 1/17/2022) for Recheck.      Janak Gross MD  01/10/2022

## 2022-01-17 ENCOUNTER — HOSPITAL ENCOUNTER (OUTPATIENT)
Dept: LABOR AND DELIVERY | Facility: HOSPITAL | Age: 22
Discharge: HOME | End: 2022-01-17

## 2022-01-17 ENCOUNTER — HOSPITAL ENCOUNTER (OUTPATIENT)
Facility: HOSPITAL | Age: 22
Discharge: HOME | End: 2022-01-17
Attending: OBSTETRICS & GYNECOLOGY | Admitting: OBSTETRICS & GYNECOLOGY

## 2022-01-17 ENCOUNTER — ROUTINE PRENATAL (OUTPATIENT)
Dept: OBSTETRICS AND GYNECOLOGY | Facility: CLINIC | Age: 22
End: 2022-01-17

## 2022-01-17 VITALS — DIASTOLIC BLOOD PRESSURE: 71 MMHG | WEIGHT: 228 LBS | BODY MASS INDEX: 43.08 KG/M2 | SYSTOLIC BLOOD PRESSURE: 108 MMHG

## 2022-01-17 VITALS — HEART RATE: 96 BPM | DIASTOLIC BLOOD PRESSURE: 51 MMHG | RESPIRATION RATE: 16 BRPM | SYSTOLIC BLOOD PRESSURE: 105 MMHG

## 2022-01-17 DIAGNOSIS — Z34.80 SUPERVISION OF OTHER NORMAL PREGNANCY, ANTEPARTUM: Primary | ICD-10-CM

## 2022-01-17 LAB
GLUCOSE UR STRIP-MCNC: NEGATIVE MG/DL
PROT UR STRIP-MCNC: NEGATIVE MG/DL

## 2022-01-17 PROCEDURE — G0463 HOSPITAL OUTPT CLINIC VISIT: HCPCS

## 2022-01-17 PROCEDURE — 87081 CULTURE SCREEN ONLY: CPT | Performed by: OBSTETRICS & GYNECOLOGY

## 2022-01-17 PROCEDURE — 99213 OFFICE O/P EST LOW 20 MIN: CPT | Performed by: OBSTETRICS & GYNECOLOGY

## 2022-01-17 NOTE — SIGNIFICANT NOTE
01/17/22 1522   Nonstress Test   Reason for NST Other (Comment)  (obesity, size greater then dates)   Acoustic Stimulator Yes   Uterine Irritability No   Contractions Irregular   Contraction Frequency (Minutes) occasional   Fetal Assessment   Fetal Movement active   Fetal HR Assessment Method external   Fetal HR (beats/min) 140   Fetal Heart Baseline Rate normal range   Fetal HR Variability moderate (amplitude range 6 to 25 bpm)   Fetal HR Accelerations greater than/equal to 15 bpm; lasting at least 15 seconds   Fetal HR Decelerations absent   Fetal HR Tracing Category Category I   Sinusoidal Pattern Present absent   Interpretation A   Nonstress Test Interpretation A Reactive     Reason for test: (P) Other (Comment) (obesity, size greater then dates)  Date of Test: 1/17/2022  Time frame of test: 3024-2979  RN NST Interpretation: (P) Reactive     /53 (BP Location: Right arm, Patient Position: Sitting)   Pulse 104   Resp 18   LMP 05/09/2021 (Exact Date)      36w1d

## 2022-01-18 NOTE — PROGRESS NOTES
OB FOLLOW UP  CC- Here for care of pregnancy        Meliton Pepe is a 21 y.o.  36w2d patient being seen today for her obstetrical follow up visit. Patient reports no complaints.     Her prenatal care is complicated by (and status) :    Patient Active Problem List   Diagnosis   • Supervision of other normal pregnancy, antepartum   • Previous  delivery, antepartum   • Obesity affecting pregnancy, antepartum   • Fetal size inconsistent with dates       Flu Status: Declines  Covid Status:    ROS -   Patient Reports : No Problems  Patient Denies: Loss of Fluid, Vaginal Spotting, Vision Changes, Headaches, Nausea , Vomiting , Contractions and Epigastric pain  Fetal Movement : normal  All other systems reviewed and are negative.       The additional following portions of the patient's history were reviewed and updated as appropriate: allergies, current medications, past family history, past medical history, past social history, past surgical history and problem list.    I have reviewed and agree with the HPI, ROS, and historical information as entered above. Albertina Elise, DO    /71   Wt 103 kg (228 lb)   LMP 2021 (Exact Date)   BMI 43.08 kg/m²       EXAM:     FHT: +dop tones via doppler   Uterine Size: 41 cm  Pelvic Exam: Yes.  Presentation: unsure. Dilation: Closed. Effacement: Long. Station: Floating.    Urine glucose/protein: See prenatal flowsheet       Assessment and Plan  Diagnoses and all orders for this visit:    1. Supervision of other normal pregnancy, antepartum (Primary)  Overview:  EDC 22 by LMP and 14week US    Initial prenatal care at [facility]  Previous  delivery  Size greater than dates  ASCUS Pap HPV positive    Covid vaccine-recommended  Flu vaccine-recommended  Tdap vaccine-2021 - recommendation 28-36 weeks.     Growth ultrasound 2021: 4 pounds 6 ounces 40th percentile    Orders:  -     POC Urinalysis Dipstick  -     Group B  Streptococcus Culture - Swab, Vaginal/Rectum         1. Pregnancy at 36w2d  2. Fetal status reassuring.   3. Activity and Exercise discussed.  4. Return in about 1 week (around 1/24/2022) for dr lim.    Albertina Elise,   01/17/2022

## 2022-01-19 LAB — BACTERIA SPEC AEROBE CULT: NORMAL

## 2022-01-27 ENCOUNTER — ROUTINE PRENATAL (OUTPATIENT)
Dept: OBSTETRICS AND GYNECOLOGY | Facility: CLINIC | Age: 22
End: 2022-01-27

## 2022-01-27 VITALS — BODY MASS INDEX: 43.46 KG/M2 | SYSTOLIC BLOOD PRESSURE: 99 MMHG | WEIGHT: 230 LBS | DIASTOLIC BLOOD PRESSURE: 65 MMHG

## 2022-01-27 DIAGNOSIS — Z34.80 SUPERVISION OF OTHER NORMAL PREGNANCY, ANTEPARTUM: Primary | ICD-10-CM

## 2022-01-27 LAB
GLUCOSE UR STRIP-MCNC: NEGATIVE MG/DL
PROT UR STRIP-MCNC: ABNORMAL MG/DL

## 2022-01-27 PROCEDURE — 99212 OFFICE O/P EST SF 10 MIN: CPT | Performed by: OBSTETRICS & GYNECOLOGY

## 2022-01-27 NOTE — PROGRESS NOTES
OB FOLLOW UP  CC- Here for care of pregnancy        Meliton Pepe is a 21 y.o.  37w4d patient being seen today for her obstetrical follow up visit. Patient reports no complaints.     Her prenatal care is complicated by (and status) :    Patient Active Problem List   Diagnosis   • Supervision of other normal pregnancy, antepartum   • Previous  delivery, antepartum   • Obesity affecting pregnancy, antepartum   • Fetal size inconsistent with dates       Flu Status: Declines  Covid Status:    ROS -   Patient Reports : No Problems  Patient Denies: Loss of Fluid, Vaginal Spotting, Vision Changes, Headaches, Nausea , Vomiting , Contractions and Epigastric pain  Fetal Movement : normal  All other systems reviewed and are negative.       The additional following portions of the patient's history were reviewed and updated as appropriate: allergies, current medications, past family history, past medical history, past social history, past surgical history and problem list.    I have reviewed and agree with the HPI, ROS, and historical information as entered above. Albertina Elise, DO    BP 99/65   Wt 104 kg (230 lb)   LMP 2021 (Exact Date)   BMI 43.46 kg/m²       EXAM:     FHT: 158 BPM   Uterine Size: 41 cm  Pelvic Exam: No    Urine glucose/protein: See prenatal flowsheet       Assessment and Plan  Diagnoses and all orders for this visit:    1. Supervision of other normal pregnancy, antepartum (Primary)  Overview:  EDC 22 by LMP and 14week US    Initial prenatal care at [facility]  Previous  delivery  Size greater than dates  ASCUS Pap HPV positive    Covid vaccine-recommended  Flu vaccine-recommended  Tdap vaccine-2021 - recommendation 28-36 weeks.     Growth ultrasound 2021: 4 pounds 6 ounces 40th percentile    Orders:  -     POC Urinalysis Dipstick         1. Pregnancy at 37w4d  2. Fetal status reassuring.   3. Activity and Exercise discussed.  4. Return in about 1  week (around 2/3/2022) for dr lim.    Albertina Elise,   01/27/2022

## 2022-02-01 ENCOUNTER — TRANSCRIBE ORDERS (OUTPATIENT)
Dept: LAB | Facility: HOSPITAL | Age: 22
End: 2022-02-01

## 2022-02-01 DIAGNOSIS — Z01.818 PREOP TESTING: Primary | ICD-10-CM

## 2022-02-04 ENCOUNTER — APPOINTMENT (OUTPATIENT)
Dept: LAB | Facility: HOSPITAL | Age: 22
End: 2022-02-04

## 2022-02-07 ENCOUNTER — LAB (OUTPATIENT)
Dept: LAB | Facility: HOSPITAL | Age: 22
End: 2022-02-07

## 2022-02-07 DIAGNOSIS — Z01.818 PREOP TESTING: ICD-10-CM

## 2022-02-07 PROCEDURE — C9803 HOPD COVID-19 SPEC COLLECT: HCPCS

## 2022-02-07 PROCEDURE — U0004 COV-19 TEST NON-CDC HGH THRU: HCPCS

## 2022-02-08 ENCOUNTER — ROUTINE PRENATAL (OUTPATIENT)
Dept: OBSTETRICS AND GYNECOLOGY | Facility: CLINIC | Age: 22
End: 2022-02-08

## 2022-02-08 VITALS — DIASTOLIC BLOOD PRESSURE: 75 MMHG | SYSTOLIC BLOOD PRESSURE: 112 MMHG | WEIGHT: 234.8 LBS | BODY MASS INDEX: 44.37 KG/M2

## 2022-02-08 DIAGNOSIS — O34.219 PREVIOUS CESAREAN DELIVERY, ANTEPARTUM: ICD-10-CM

## 2022-02-08 DIAGNOSIS — Z34.80 SUPERVISION OF OTHER NORMAL PREGNANCY, ANTEPARTUM: Primary | ICD-10-CM

## 2022-02-08 DIAGNOSIS — O26.849 FETAL SIZE INCONSISTENT WITH DATES: ICD-10-CM

## 2022-02-08 DIAGNOSIS — O99.210 OBESITY AFFECTING PREGNANCY, ANTEPARTUM: ICD-10-CM

## 2022-02-08 LAB
GLUCOSE UR STRIP-MCNC: NEGATIVE MG/DL
PROT UR STRIP-MCNC: NEGATIVE MG/DL
SARS-COV-2 RNA PNL SPEC NAA+PROBE: NOT DETECTED

## 2022-02-08 PROCEDURE — 99214 OFFICE O/P EST MOD 30 MIN: CPT | Performed by: OBSTETRICS & GYNECOLOGY

## 2022-02-08 NOTE — PROGRESS NOTES
OB FOLLOW UP    CC: Scheduled OB routine FU     Prenatal care complicated by:   Patient Active Problem List   Diagnosis   • Supervision of other normal pregnancy, antepartum   • Previous  delivery, antepartum   • Obesity affecting pregnancy, antepartum   • Fetal size inconsistent with dates       Subjective:   Patient has: No complaints, No leaking fluid, No vaginal bleeding, Adequate FM  Reports occasional contractions.    Objective:  Urine glucose/protein- see flow sheet      /75   Wt 107 kg (234 lb 12.8 oz)   LMP 2021 (Exact Date)   BMI 44.37 kg/m²   See OB flow for LE edema, and cvx exam if applicable  FHT: 146 BPM   Uterine Size: 41 cm      Assessment and Plan:  Diagnoses and all orders for this visit:    1. Supervision of other normal pregnancy, antepartum (Primary)  Overview:  EDC 22 by LMP and 14week US    Initial prenatal care at [facility]  Previous  delivery  Size greater than dates  ASCUS Pap HPV positive    Covid vaccine-recommended  Flu vaccine-recommended  Tdap vaccine-2021 - recommendation 28-36 weeks.     Growth ultrasound 2021: 4 pounds 6 ounces 40th percentile    Assessment & Plan:  Continue prenatal vitamins  Fetal kick counts  Labor instructions    Orders:  -     POC Urinalysis Dipstick    2. Previous  delivery, antepartum  Overview:  Repeat c/s 2/10/22    Assessment & Plan:  We have reviewed the risks, benefits, and alternatives of the procedure including the risk of: bleeding, infection, hemorrhage, blood transfusion, risk of injury to nearby structures including: bowl, bladder, pelvic blood vessels and nerves, risk of injury to the baby, risk of anesthesia, venous thromboembolism, myocardial infarction, stroke, and death. We have also discussed the risks of repetitive  deliveries including the risk of abnormal placentation such as placenta accreta. The patient expresses her understanding of these risks and wishes to  proceed.      3. Obesity affecting pregnancy, antepartum  Overview:  NSTs at 36 weeks -order done 1/10/2022    Assessment & Plan:  Discussed exercise, nutrition and appropriate weight gain in pregnancy  Continue NSTs      4. Fetal size inconsistent with dates  Overview:  Growth on 12/29/2021 40th percentile, 4 pounds 6 ounces.          39w2d  Reassuring pregnancy progress    Counseling: OB precautions, leaking, VB, curry bermeo vs PTL/Labor  FKC    Questions answered    No follow-ups on file.      Janak Gross MD  02/08/2022

## 2022-02-09 ENCOUNTER — PREP FOR SURGERY (OUTPATIENT)
Dept: OTHER | Facility: HOSPITAL | Age: 22
End: 2022-02-09

## 2022-02-09 DIAGNOSIS — O34.219 PREVIOUS CESAREAN DELIVERY, ANTEPARTUM: Primary | ICD-10-CM

## 2022-02-09 RX ORDER — MISOPROSTOL 200 UG/1
800 TABLET ORAL AS NEEDED
Status: CANCELLED | OUTPATIENT
Start: 2022-02-09

## 2022-02-09 RX ORDER — ONDANSETRON 4 MG/1
4 TABLET, FILM COATED ORAL EVERY 6 HOURS PRN
Status: CANCELLED | OUTPATIENT
Start: 2022-02-09

## 2022-02-09 RX ORDER — ONDANSETRON 2 MG/ML
4 INJECTION INTRAMUSCULAR; INTRAVENOUS EVERY 6 HOURS PRN
Status: CANCELLED | OUTPATIENT
Start: 2022-02-09

## 2022-02-09 RX ORDER — CEFAZOLIN SODIUM 2 G/100ML
2 INJECTION, SOLUTION INTRAVENOUS ONCE
Status: CANCELLED | OUTPATIENT
Start: 2022-02-09 | End: 2022-02-09

## 2022-02-09 RX ORDER — TRISODIUM CITRATE DIHYDRATE AND CITRIC ACID MONOHYDRATE 500; 334 MG/5ML; MG/5ML
30 SOLUTION ORAL ONCE
Status: CANCELLED | OUTPATIENT
Start: 2022-02-09 | End: 2022-02-09

## 2022-02-09 RX ORDER — TRANEXAMIC ACID 10 MG/ML
1000 INJECTION, SOLUTION INTRAVENOUS ONCE AS NEEDED
Status: CANCELLED | OUTPATIENT
Start: 2022-02-09

## 2022-02-09 RX ORDER — SODIUM CHLORIDE 0.9 % (FLUSH) 0.9 %
10 SYRINGE (ML) INJECTION AS NEEDED
Status: CANCELLED | OUTPATIENT
Start: 2022-02-09

## 2022-02-09 RX ORDER — FAMOTIDINE 20 MG/1
20 TABLET, FILM COATED ORAL ONCE AS NEEDED
Status: CANCELLED | OUTPATIENT
Start: 2022-02-09

## 2022-02-09 RX ORDER — LIDOCAINE HYDROCHLORIDE 10 MG/ML
5 INJECTION, SOLUTION EPIDURAL; INFILTRATION; INTRACAUDAL; PERINEURAL AS NEEDED
Status: CANCELLED | OUTPATIENT
Start: 2022-02-09

## 2022-02-09 RX ORDER — SODIUM CHLORIDE 0.9 % (FLUSH) 0.9 %
3 SYRINGE (ML) INJECTION EVERY 12 HOURS SCHEDULED
Status: CANCELLED | OUTPATIENT
Start: 2022-02-09

## 2022-02-09 RX ORDER — METHYLERGONOVINE MALEATE 0.2 MG/ML
200 INJECTION INTRAVENOUS ONCE AS NEEDED
Status: CANCELLED | OUTPATIENT
Start: 2022-02-09

## 2022-02-09 RX ORDER — CARBOPROST TROMETHAMINE 250 UG/ML
250 INJECTION, SOLUTION INTRAMUSCULAR AS NEEDED
Status: CANCELLED | OUTPATIENT
Start: 2022-02-09

## 2022-02-09 RX ORDER — OXYTOCIN-SODIUM CHLORIDE 0.9% IV SOLN 30 UNIT/500ML 30-0.9/5 UT/ML-%
125 SOLUTION INTRAVENOUS ONCE
Status: CANCELLED | OUTPATIENT
Start: 2022-02-09 | End: 2022-02-09

## 2022-02-09 RX ORDER — FAMOTIDINE 10 MG/ML
20 INJECTION, SOLUTION INTRAVENOUS ONCE AS NEEDED
Status: CANCELLED | OUTPATIENT
Start: 2022-02-09

## 2022-02-09 RX ORDER — METOCLOPRAMIDE HYDROCHLORIDE 5 MG/ML
10 INJECTION INTRAMUSCULAR; INTRAVENOUS
Status: CANCELLED | OUTPATIENT
Start: 2022-02-10 | End: 2022-02-11

## 2022-02-09 RX ORDER — SODIUM CHLORIDE, SODIUM LACTATE, POTASSIUM CHLORIDE, CALCIUM CHLORIDE 600; 310; 30; 20 MG/100ML; MG/100ML; MG/100ML; MG/100ML
150 INJECTION, SOLUTION INTRAVENOUS CONTINUOUS
Status: CANCELLED | OUTPATIENT
Start: 2022-02-09

## 2022-02-09 NOTE — ASSESSMENT & PLAN NOTE
We have reviewed the risks, benefits, and alternatives of the procedure including the risk of: bleeding, infection, hemorrhage, blood transfusion, risk of injury to nearby structures including: bowl, bladder, pelvic blood vessels and nerves, risk of injury to the baby, risk of anesthesia, venous thromboembolism, myocardial infarction, stroke, and death. We have also discussed the risks of repetitive  deliveries including the risk of abnormal placentation such as placenta accreta. The patient expresses her understanding of these risks and wishes to proceed.

## 2022-02-09 NOTE — H&P
MARLEE Gandhi  Obstetric History and Physical    Chief Complaint:  Scheduled CS    Subjective:  The patient is a 21 y.o.  currently at 39w3d, who presents for a repeat  delivery at term.  She has no complaints today.  She reports only occasional contractions.  She denies any vaginal bleeding or loss of fluid..    Her prenatal care is complicated by: Elevated BMI- pre-pregnancy, Prior     The following portions of the patients history were reviewed and updated as appropriate: current medications, allergies, past medical history, past surgical history, past family history, past social history and problem list .     Prenatal Information:  Prenatal Results     POC Urine Glucose/Protein     Test Value Reference Range Date Time    Urine Glucose  Negative mg/dL Negative, 1000 mg/dL (3+) 22 1022    Urine Protein  Negative mg/dL Negative 22 1022          Initial Prenatal Labs     Test Value Reference Range Date Time    Hemoglobin  11.7 g/dL 12.0 - 15.9 21 1103       11.4 g/dL 12.0 - 15.9 21 1147    Hematocrit  35.7 % 34.0 - 46.6 21 1103       34.8 % 34.0 - 46.6 21 1147    Platelets  231 10*3/mm3 140 - 450 21 1103       258 10*3/mm3 140 - 450 21 1147    Rubella IgG  14.20 index Immune >0.99 21 1103       16.10 index Immune >0.99 21 1147    Hepatitis B SAg  Non-Reactive  Non-Reactive 21 1103       Non-Reactive  Non-Reactive 21 1147    Hepatitis C Ab  Non-Reactive  Non-Reactive 21 1103       Non-Reactive  Non-Reactive 21 1147    RPR  Non-Reactive  Non-Reactive 21 1103       Non-Reactive  Non-Reactive 21 1147    ABO  B   21 1103    Rh  Positive   21 1103    Antibody Screen  Negative   21 1103       Negative   21 1147    HIV  Non-Reactive  Non-Reactive 21 1103       Non-Reactive  Non-Reactive 21 1147    Urine Culture  No growth   21 1141    Gonorrhea ^ Neg   21      Chlamydia ^ Neg   09/17/21     TSH        HgB A1c               2nd and 3rd Trimester     Test Value Reference Range Date Time    Hemoglobin (repeated)  11.7 g/dL 12.0 - 15.9 11/18/21 1103    Hematocrit (repeated)  35.7 % 34.0 - 46.6 11/18/21 1103    Platelets   231 10*3/mm3 140 - 450 11/18/21 1103       258 10*3/mm3 140 - 450 09/17/21 1147    GCT  152 mg/dL 65 - 139 11/18/21 1103    Antibody Screen (repeated)  Negative   11/18/21 1103    GTT Fasting ^ 89   12/07/21     GTT 1 Hr ^ 130   12/07/21     GTT 2 Hr ^ 120   12/07/21     GTT 3 Hr ^ 124   12/07/21     Group B Strep  No Group B Streptococcus isolated   01/17/22 1424          Drug Screening     Test Value Reference Range Date Time    Amphetamine Screen        Barbiturate Screen        Benzodiazepine Screen        Methadone Screen        Phencyclidine Screen        Opiates Screen        THC Screen        Cocaine Screen        Propoxyphene Screen        Buprenorphine Screen        Methamphetamine Screen        Oxycodone Screen        Tricyclic Antidepressants Screen              Other (Risk screening)     Test Value Reference Range Date Time    Varicella IgG        Parvovirus IgG        CMV IgG        Cystic Fibrosis        Hemoglobin electrophoresis        NIPT        MSAFP-4        AFP (for NTD only)              Legend    ^: Historical                      External Prenatal Results     Pregnancy Outside Results - Transcribed From Office Records - See Scanned Records For Details     Test Value Date Time    ABO  B  11/18/21 1103    Rh  Positive  11/18/21 1103    Antibody Screen  Negative  11/18/21 1103       Negative  09/17/21 1147    Varicella IgG       Rubella  14.20 index 11/18/21 1103       16.10 index 09/17/21 1147    Hgb  11.7 g/dL 11/18/21 1103       11.4 g/dL 09/17/21 1147    Hct  35.7 % 11/18/21 1103       34.8 % 09/17/21 1147    Glucose Fasting GTT ^ 89  12/07/21     Glucose Tolerance Test 1 hour ^ 130  12/07/21     Glucose Tolerance Test 3 hour ^ 124   21     Gonorrhea (discrete) ^ Neg  21     Chlamydia (discrete) ^ Neg  21     RPR  Non-Reactive  21 1103       Non-Reactive  21 1147    VDRL       Syphilis Antibody       HBsAg  Non-Reactive  21 1103       Non-Reactive  21 1147    Herpes Simplex Virus PCR       Herpes Simplex VIrus Culture       HIV  Non-Reactive  21 1103       Non-Reactive  21 1147    Hep C RNA Quant PCR       Hep C Antibody  Non-Reactive  21 1103       Non-Reactive  21 1147    AFP       Group B Strep  No Group B Streptococcus isolated  22 1424    GBS Susceptibility to Clindamycin       GBS Susceptibility to Erythromycin       Fetal Fibronectin       Genetic Testing, Maternal Blood             Drug Screening     Test Value Date Time    Urine Drug Screen       Amphetamine Screen       Barbiturate Screen       Benzodiazepine Screen       Methadone Screen       Phencyclidine Screen       Opiates Screen       THC Screen       Cocaine Screen       Propoxyphene Screen       Buprenorphine Screen       Methamphetamine Screen       Oxycodone Screen       Tricyclic Antidepressants Screen             Legend    ^: Historical                        Past OB History:  OB History    Para Term  AB Living   2 1 0 0 0 0   SAB IAB Ectopic Molar Multiple Live Births   0 0 0 0 0 0      # Outcome Date GA Lbr Jayson/2nd Weight Sex Delivery Anes PTL Lv   2 Current            1 Para 20   3544 g (7 lb 13 oz)  CS-LTranv         Obstetric Comments   Menarche at 16, regular, 5-7 days       Past Medical History:  Past Medical History:   Diagnosis Date   • Abnormal glucose complicating pregnancy     DELIVERY;PUERPERIUM   • Acute vaginitis    • Chlamydia    • Chlamydial infection, unspecified    • Gonococcal infection, unspecified    • Vitamin D deficiency, unspecified        Past Surgical History:  Past Surgical History:   Procedure Laterality Date   •  SECTION         Family  History:  Family History   Problem Relation Age of Onset   • Breast cancer Paternal Grandmother    • Ovarian cancer Paternal Aunt        Social History:   reports that she has never smoked. She has never used smokeless tobacco.   reports previous alcohol use.   reports no history of drug use.    Medications:  Prenatal Multi +DHA    Allergies:  No Known Allergies    Review of Systems:  No leaking fluid, No vaginal bleeding, Adequate FM, No HA, No scotomata or vision changes, No RUQ/epigastric pain, No fever/chills, No nausea, No vomiting, No diarrhea, No constipation, No abdominal pain, Contractions, Swelling and Back pain    Objective     Vital Signs:        Physical Exam:    General:  alert, well appearing, in no apparent distress  HEENT: PERRLA, extra ocular movement intact, sclera clear, anicteric and neck supple with midline trachea  Cardiovascular: normal rate, regular rhythm,  no murmurs, rubs, or gallops  Lungs: clear to auscultation, no wheezes, rales or rhonchi, symmetric air entry  Abdomen: soft, nontender, nondistended, no abnormal masses, no epigastric pain, fundus soft, nontender 41 weeks size and estimated fetal weight: 7.5-8 pounds  Extremities: 1+ edema, no redness or tenderness in the calves or thighs 2+ bilaterally    Fetal Assessment:    FHR:   Baseline: 140  Variability: Moderate  Accelerations: Present (32 weeks+) 15 x 15 bpm  Decelerations: Absent   Category: Category 1    Contractions: Irregular    Fetal Presentation: cephalic    Labs:   Lab Results (last 24 hours)     ** No results found for the last 24 hours. **           Hospital Problems:    Previous  delivery, antepartum    Obesity affecting pregnancy, antepartum      Assessment:  21 y.o.  currently at 39w3d    Previous  delivery, antepartum    Obesity affecting pregnancy, antepartum    GBS: Negative    Plan:    We have reviewed the risks, benefits, and alternatives of the procedure including the risk of:  bleeding, infection, hemorrhage, blood transfusion, risk of injury to nearby structures including: bowl, bladder, pelvic blood vessels and nerves, risk of injury to the baby, risk of anesthesia, venous thromboembolism, myocardial infarction, stroke, and death. We have also discussed the risks of repetitive  deliveries including the risk of abnormal placentation such as placenta accreta. The patient expresses her understanding of these risks and wishes to proceed.  Plan of care has been reviewed with patient  Risks, benefits of treatment plan have been discussed.  All questions have been answered.    Janak Gross MD  2022  13:15 EST

## 2022-02-10 ENCOUNTER — ANESTHESIA EVENT (OUTPATIENT)
Dept: LABOR AND DELIVERY | Facility: HOSPITAL | Age: 22
End: 2022-02-10

## 2022-02-10 ENCOUNTER — HOSPITAL ENCOUNTER (INPATIENT)
Facility: HOSPITAL | Age: 22
LOS: 2 days | Discharge: HOME | End: 2022-02-12
Attending: OBSTETRICS & GYNECOLOGY | Admitting: OBSTETRICS & GYNECOLOGY

## 2022-02-10 ENCOUNTER — ANESTHESIA (OUTPATIENT)
Dept: LABOR AND DELIVERY | Facility: HOSPITAL | Age: 22
End: 2022-02-10

## 2022-02-10 DIAGNOSIS — O34.219 PREVIOUS CESAREAN DELIVERY, ANTEPARTUM: ICD-10-CM

## 2022-02-10 LAB
ABO GROUP BLD: NORMAL
BASE EXCESS BLDCOA CALC-SCNC: -1.9 MMOL/L
BASE EXCESS BLDCOV CALC-SCNC: -2.7 MMOL/L
BASOPHILS # BLD AUTO: 0.04 10*3/MM3 (ref 0–0.2)
BASOPHILS NFR BLD AUTO: 0.3 % (ref 0–1.5)
BLD GP AB SCN SERPL QL: NEGATIVE
DEPRECATED RDW RBC AUTO: 43.5 FL (ref 37–54)
EOSINOPHIL # BLD AUTO: 0.01 10*3/MM3 (ref 0–0.4)
EOSINOPHIL NFR BLD AUTO: 0.1 % (ref 0.3–6.2)
ERYTHROCYTE [DISTWIDTH] IN BLOOD BY AUTOMATED COUNT: 15.5 % (ref 12.3–15.4)
HCO3 BLDCOA-SCNC: 25.5 MMOL/L
HCO3 BLDCOV-SCNC: 24.1 MMOL/L
HCT VFR BLD AUTO: 36.6 % (ref 34–46.6)
HGB BLD-MCNC: 11.9 G/DL (ref 12–15.9)
IMM GRANULOCYTES # BLD AUTO: 0.09 10*3/MM3 (ref 0–0.05)
IMM GRANULOCYTES NFR BLD AUTO: 0.7 % (ref 0–0.5)
LYMPHOCYTES # BLD AUTO: 2.39 10*3/MM3 (ref 0.7–3.1)
LYMPHOCYTES NFR BLD AUTO: 19.2 % (ref 19.6–45.3)
MCH RBC QN AUTO: 25.5 PG (ref 26.6–33)
MCHC RBC AUTO-ENTMCNC: 32.5 G/DL (ref 31.5–35.7)
MCV RBC AUTO: 78.4 FL (ref 79–97)
MONOCYTES # BLD AUTO: 1.15 10*3/MM3 (ref 0.1–0.9)
MONOCYTES NFR BLD AUTO: 9.2 % (ref 5–12)
NEUTROPHILS NFR BLD AUTO: 70.5 % (ref 42.7–76)
NEUTROPHILS NFR BLD AUTO: 8.78 10*3/MM3 (ref 1.7–7)
NRBC BLD AUTO-RTO: 0 /100 WBC (ref 0–0.2)
PCO2 BLDCOA: 54.1 MMHG (ref 33–49)
PCO2 BLDCOV: 49.7 MM HG (ref 28–40)
PH BLDCOA: 7.29 PH UNITS (ref 7.21–7.31)
PH BLDCOV: 7.3 PH UNITS (ref 7.31–7.37)
PLATELET # BLD AUTO: 208 10*3/MM3 (ref 140–450)
PMV BLD AUTO: 11.1 FL (ref 6–12)
PO2 BLDCOA: <40.5 MMHG
PO2 BLDCOV: <40.5 MM HG (ref 21–31)
RBC # BLD AUTO: 4.67 10*6/MM3 (ref 3.77–5.28)
RH BLD: POSITIVE
T&S EXPIRATION DATE: NORMAL
WBC NRBC COR # BLD: 12.46 10*3/MM3 (ref 3.4–10.8)

## 2022-02-10 PROCEDURE — 0 MORPHINE PER 10 MG: Performed by: ANESTHESIOLOGY

## 2022-02-10 PROCEDURE — 86900 BLOOD TYPING SEROLOGIC ABO: CPT | Performed by: OBSTETRICS & GYNECOLOGY

## 2022-02-10 PROCEDURE — 0 CEFAZOLIN IN DEXTROSE 2-4 GM/100ML-% SOLUTION: Performed by: OBSTETRICS & GYNECOLOGY

## 2022-02-10 PROCEDURE — 25010000002 KETOROLAC TROMETHAMINE PER 15 MG: Performed by: NURSE ANESTHETIST, CERTIFIED REGISTERED

## 2022-02-10 PROCEDURE — 82803 BLOOD GASES ANY COMBINATION: CPT | Performed by: OBSTETRICS & GYNECOLOGY

## 2022-02-10 PROCEDURE — 86901 BLOOD TYPING SEROLOGIC RH(D): CPT | Performed by: OBSTETRICS & GYNECOLOGY

## 2022-02-10 PROCEDURE — 25010000002 METOCLOPRAMIDE PER 10 MG: Performed by: OBSTETRICS & GYNECOLOGY

## 2022-02-10 PROCEDURE — 25010000002 METHYLERGONOVINE MALEATE PER 0.2 MG: Performed by: OBSTETRICS & GYNECOLOGY

## 2022-02-10 PROCEDURE — 85025 COMPLETE CBC W/AUTO DIFF WBC: CPT | Performed by: OBSTETRICS & GYNECOLOGY

## 2022-02-10 PROCEDURE — 59515 CESAREAN DELIVERY: CPT | Performed by: OBSTETRICS & GYNECOLOGY

## 2022-02-10 PROCEDURE — 25010000002 ONDANSETRON PER 1 MG: Performed by: OBSTETRICS & GYNECOLOGY

## 2022-02-10 PROCEDURE — 86850 RBC ANTIBODY SCREEN: CPT | Performed by: OBSTETRICS & GYNECOLOGY

## 2022-02-10 RX ORDER — METOCLOPRAMIDE HYDROCHLORIDE 5 MG/ML
10 INJECTION INTRAMUSCULAR; INTRAVENOUS
Status: DISCONTINUED | OUTPATIENT
Start: 2022-02-11 | End: 2022-02-10

## 2022-02-10 RX ORDER — BUPIVACAINE HYDROCHLORIDE 7.5 MG/ML
INJECTION, SOLUTION EPIDURAL; RETROBULBAR
Status: COMPLETED | OUTPATIENT
Start: 2022-02-10 | End: 2022-02-10

## 2022-02-10 RX ORDER — DOCUSATE SODIUM 100 MG/1
100 CAPSULE, LIQUID FILLED ORAL 2 TIMES DAILY
Status: DISCONTINUED | OUTPATIENT
Start: 2022-02-10 | End: 2022-02-12 | Stop reason: HOSPADM

## 2022-02-10 RX ORDER — MORPHINE SULFATE 0.5 MG/ML
INJECTION, SOLUTION EPIDURAL; INTRATHECAL; INTRAVENOUS
Status: COMPLETED | OUTPATIENT
Start: 2022-02-10 | End: 2022-02-10

## 2022-02-10 RX ORDER — METOCLOPRAMIDE HYDROCHLORIDE 5 MG/ML
10 INJECTION INTRAMUSCULAR; INTRAVENOUS
Status: COMPLETED | OUTPATIENT
Start: 2022-02-10 | End: 2022-02-10

## 2022-02-10 RX ORDER — SODIUM CHLORIDE 0.9 % (FLUSH) 0.9 %
10 SYRINGE (ML) INJECTION AS NEEDED
Status: DISCONTINUED | OUTPATIENT
Start: 2022-02-10 | End: 2022-02-10

## 2022-02-10 RX ORDER — OXYCODONE HYDROCHLORIDE 5 MG/1
5 TABLET ORAL
Status: DISCONTINUED | OUTPATIENT
Start: 2022-02-10 | End: 2022-02-12 | Stop reason: HOSPADM

## 2022-02-10 RX ORDER — MORPHINE SULFATE 2 MG/ML
2 INJECTION, SOLUTION INTRAMUSCULAR; INTRAVENOUS EVERY 4 HOURS PRN
Status: ACTIVE | OUTPATIENT
Start: 2022-02-10 | End: 2022-02-11

## 2022-02-10 RX ORDER — SODIUM CHLORIDE, SODIUM LACTATE, POTASSIUM CHLORIDE, CALCIUM CHLORIDE 600; 310; 30; 20 MG/100ML; MG/100ML; MG/100ML; MG/100ML
150 INJECTION, SOLUTION INTRAVENOUS CONTINUOUS
Status: DISCONTINUED | OUTPATIENT
Start: 2022-02-10 | End: 2022-02-12 | Stop reason: HOSPADM

## 2022-02-10 RX ORDER — METHYLERGONOVINE MALEATE 0.2 MG/ML
INJECTION INTRAVENOUS AS NEEDED
Status: DISCONTINUED | OUTPATIENT
Start: 2022-02-10 | End: 2022-02-12 | Stop reason: HOSPADM

## 2022-02-10 RX ORDER — OXYTOCIN-SODIUM CHLORIDE 0.9% IV SOLN 30 UNIT/500ML 30-0.9/5 UT/ML-%
125 SOLUTION INTRAVENOUS ONCE
Status: DISCONTINUED | OUTPATIENT
Start: 2022-02-10 | End: 2022-02-10 | Stop reason: HOSPADM

## 2022-02-10 RX ORDER — PHENYLEPHRINE HCL IN 0.9% NACL 1 MG/10 ML
SYRINGE (ML) INTRAVENOUS AS NEEDED
Status: DISCONTINUED | OUTPATIENT
Start: 2022-02-10 | End: 2022-02-10 | Stop reason: SURG

## 2022-02-10 RX ORDER — ALUMINA, MAGNESIA, AND SIMETHICONE 2400; 2400; 240 MG/30ML; MG/30ML; MG/30ML
15 SUSPENSION ORAL EVERY 4 HOURS PRN
Status: DISCONTINUED | OUTPATIENT
Start: 2022-02-10 | End: 2022-02-12 | Stop reason: HOSPADM

## 2022-02-10 RX ORDER — ONDANSETRON 2 MG/ML
4 INJECTION INTRAMUSCULAR; INTRAVENOUS EVERY 6 HOURS PRN
Status: DISCONTINUED | OUTPATIENT
Start: 2022-02-10 | End: 2022-02-10 | Stop reason: HOSPADM

## 2022-02-10 RX ORDER — FAMOTIDINE 10 MG/ML
20 INJECTION, SOLUTION INTRAVENOUS ONCE AS NEEDED
Status: DISCONTINUED | OUTPATIENT
Start: 2022-02-10 | End: 2022-02-10 | Stop reason: HOSPADM

## 2022-02-10 RX ORDER — SODIUM CHLORIDE 0.9 % (FLUSH) 0.9 %
3 SYRINGE (ML) INJECTION EVERY 12 HOURS SCHEDULED
Status: DISCONTINUED | OUTPATIENT
Start: 2022-02-10 | End: 2022-02-10

## 2022-02-10 RX ORDER — TRISODIUM CITRATE DIHYDRATE AND CITRIC ACID MONOHYDRATE 500; 334 MG/5ML; MG/5ML
30 SOLUTION ORAL ONCE
Status: COMPLETED | OUTPATIENT
Start: 2022-02-10 | End: 2022-02-10

## 2022-02-10 RX ORDER — SODIUM CHLORIDE, SODIUM LACTATE, POTASSIUM CHLORIDE, CALCIUM CHLORIDE 600; 310; 30; 20 MG/100ML; MG/100ML; MG/100ML; MG/100ML
9 INJECTION, SOLUTION INTRAVENOUS CONTINUOUS PRN
Status: DISCONTINUED | OUTPATIENT
Start: 2022-02-10 | End: 2022-02-10

## 2022-02-10 RX ORDER — BISACODYL 10 MG
10 SUPPOSITORY, RECTAL RECTAL DAILY PRN
Status: DISCONTINUED | OUTPATIENT
Start: 2022-02-10 | End: 2022-02-12 | Stop reason: HOSPADM

## 2022-02-10 RX ORDER — PRENATAL VIT/IRON FUM/FOLIC AC 27MG-0.8MG
1 TABLET ORAL DAILY
Status: DISCONTINUED | OUTPATIENT
Start: 2022-02-10 | End: 2022-02-12 | Stop reason: HOSPADM

## 2022-02-10 RX ORDER — HYDROCODONE BITARTRATE AND ACETAMINOPHEN 5; 325 MG/1; MG/1
2 TABLET ORAL EVERY 4 HOURS PRN
Status: DISCONTINUED | OUTPATIENT
Start: 2022-02-10 | End: 2022-02-12 | Stop reason: HOSPADM

## 2022-02-10 RX ORDER — ACETAMINOPHEN 500 MG
1000 TABLET ORAL ONCE
Status: DISCONTINUED | OUTPATIENT
Start: 2022-02-10 | End: 2022-02-10

## 2022-02-10 RX ORDER — DIPHENHYDRAMINE HCL 25 MG
25 CAPSULE ORAL EVERY 6 HOURS PRN
Status: ACTIVE | OUTPATIENT
Start: 2022-02-10 | End: 2022-02-11

## 2022-02-10 RX ORDER — OXYTOCIN 10 [USP'U]/ML
INJECTION, SOLUTION INTRAMUSCULAR; INTRAVENOUS AS NEEDED
Status: DISCONTINUED | OUTPATIENT
Start: 2022-02-10 | End: 2022-02-10 | Stop reason: SURG

## 2022-02-10 RX ORDER — CARBOPROST TROMETHAMINE 250 UG/ML
250 INJECTION, SOLUTION INTRAMUSCULAR AS NEEDED
Status: DISCONTINUED | OUTPATIENT
Start: 2022-02-10 | End: 2022-02-10

## 2022-02-10 RX ORDER — IBUPROFEN 600 MG/1
600 TABLET ORAL EVERY 6 HOURS SCHEDULED
Status: DISCONTINUED | OUTPATIENT
Start: 2022-02-11 | End: 2022-02-12 | Stop reason: HOSPADM

## 2022-02-10 RX ORDER — HYDROCODONE BITARTRATE AND ACETAMINOPHEN 5; 325 MG/1; MG/1
1 TABLET ORAL EVERY 6 HOURS PRN
Status: ACTIVE | OUTPATIENT
Start: 2022-02-10 | End: 2022-02-11

## 2022-02-10 RX ORDER — KETOROLAC TROMETHAMINE 30 MG/ML
30 INJECTION, SOLUTION INTRAMUSCULAR; INTRAVENOUS EVERY 6 HOURS
Status: COMPLETED | OUTPATIENT
Start: 2022-02-10 | End: 2022-02-11

## 2022-02-10 RX ORDER — HYDROMORPHONE HCL 110MG/55ML
0.5 PATIENT CONTROLLED ANALGESIA SYRINGE INTRAVENOUS
Status: DISCONTINUED | OUTPATIENT
Start: 2022-02-10 | End: 2022-02-12 | Stop reason: HOSPADM

## 2022-02-10 RX ORDER — TRANEXAMIC ACID 10 MG/ML
1000 INJECTION, SOLUTION INTRAVENOUS ONCE AS NEEDED
Status: DISCONTINUED | OUTPATIENT
Start: 2022-02-10 | End: 2022-02-10

## 2022-02-10 RX ORDER — EPHEDRINE SULFATE 50 MG/ML
INJECTION, SOLUTION INTRAVENOUS AS NEEDED
Status: DISCONTINUED | OUTPATIENT
Start: 2022-02-10 | End: 2022-02-10 | Stop reason: SURG

## 2022-02-10 RX ORDER — FAMOTIDINE 10 MG/ML
10 INJECTION, SOLUTION INTRAVENOUS ONCE
Status: DISCONTINUED | OUTPATIENT
Start: 2022-02-10 | End: 2022-02-10

## 2022-02-10 RX ORDER — METOCLOPRAMIDE HYDROCHLORIDE 5 MG/ML
10 INJECTION INTRAMUSCULAR; INTRAVENOUS ONCE AS NEEDED
Status: DISCONTINUED | OUTPATIENT
Start: 2022-02-10 | End: 2022-02-10

## 2022-02-10 RX ORDER — KETOROLAC TROMETHAMINE 30 MG/ML
INJECTION, SOLUTION INTRAMUSCULAR; INTRAVENOUS AS NEEDED
Status: DISCONTINUED | OUTPATIENT
Start: 2022-02-10 | End: 2022-02-10 | Stop reason: SURG

## 2022-02-10 RX ORDER — DIPHENHYDRAMINE HYDROCHLORIDE 50 MG/ML
12.5 INJECTION INTRAMUSCULAR; INTRAVENOUS EVERY 6 HOURS PRN
Status: ACTIVE | OUTPATIENT
Start: 2022-02-10 | End: 2022-02-11

## 2022-02-10 RX ORDER — LIDOCAINE HYDROCHLORIDE 10 MG/ML
5 INJECTION, SOLUTION EPIDURAL; INFILTRATION; INTRACAUDAL; PERINEURAL AS NEEDED
Status: DISCONTINUED | OUTPATIENT
Start: 2022-02-10 | End: 2022-02-10

## 2022-02-10 RX ORDER — FAMOTIDINE 20 MG/1
20 TABLET, FILM COATED ORAL ONCE AS NEEDED
Status: DISCONTINUED | OUTPATIENT
Start: 2022-02-10 | End: 2022-02-10 | Stop reason: HOSPADM

## 2022-02-10 RX ORDER — HYDROCODONE BITARTRATE AND ACETAMINOPHEN 5; 325 MG/1; MG/1
1 TABLET ORAL EVERY 4 HOURS PRN
Status: DISCONTINUED | OUTPATIENT
Start: 2022-02-11 | End: 2022-02-12 | Stop reason: HOSPADM

## 2022-02-10 RX ORDER — SODIUM CHLORIDE, SODIUM LACTATE, POTASSIUM CHLORIDE, CALCIUM CHLORIDE 600; 310; 30; 20 MG/100ML; MG/100ML; MG/100ML; MG/100ML
150 INJECTION, SOLUTION INTRAVENOUS CONTINUOUS
Status: DISCONTINUED | OUTPATIENT
Start: 2022-02-10 | End: 2022-02-10

## 2022-02-10 RX ORDER — HYDROMORPHONE HCL 110MG/55ML
0.25 PATIENT CONTROLLED ANALGESIA SYRINGE INTRAVENOUS
Status: DISCONTINUED | OUTPATIENT
Start: 2022-02-10 | End: 2022-02-12 | Stop reason: HOSPADM

## 2022-02-10 RX ORDER — METHYLERGONOVINE MALEATE 0.2 MG/ML
200 INJECTION INTRAVENOUS ONCE AS NEEDED
Status: COMPLETED | OUTPATIENT
Start: 2022-02-10 | End: 2022-02-10

## 2022-02-10 RX ORDER — CEFAZOLIN SODIUM 2 G/100ML
2 INJECTION, SOLUTION INTRAVENOUS ONCE
Status: COMPLETED | OUTPATIENT
Start: 2022-02-10 | End: 2022-02-10

## 2022-02-10 RX ORDER — CALCIUM CARBONATE 200(500)MG
1 TABLET,CHEWABLE ORAL EVERY 4 HOURS PRN
Status: DISCONTINUED | OUTPATIENT
Start: 2022-02-10 | End: 2022-02-12 | Stop reason: HOSPADM

## 2022-02-10 RX ORDER — ONDANSETRON 4 MG/1
4 TABLET, FILM COATED ORAL EVERY 6 HOURS PRN
Status: DISCONTINUED | OUTPATIENT
Start: 2022-02-10 | End: 2022-02-10 | Stop reason: HOSPADM

## 2022-02-10 RX ORDER — MEPERIDINE HYDROCHLORIDE 25 MG/ML
12.5 INJECTION INTRAMUSCULAR; INTRAVENOUS; SUBCUTANEOUS
Status: ACTIVE | OUTPATIENT
Start: 2022-02-10 | End: 2022-02-11

## 2022-02-10 RX ORDER — ONDANSETRON 2 MG/ML
4 INJECTION INTRAMUSCULAR; INTRAVENOUS EVERY 6 HOURS PRN
Status: DISCONTINUED | OUTPATIENT
Start: 2022-02-10 | End: 2022-02-12 | Stop reason: HOSPADM

## 2022-02-10 RX ORDER — NALOXONE HCL 0.4 MG/ML
0.4 VIAL (ML) INJECTION ONCE AS NEEDED
Status: ACTIVE | OUTPATIENT
Start: 2022-02-10 | End: 2022-02-11

## 2022-02-10 RX ORDER — MISOPROSTOL 200 UG/1
800 TABLET ORAL AS NEEDED
Status: DISCONTINUED | OUTPATIENT
Start: 2022-02-10 | End: 2022-02-10

## 2022-02-10 RX ADMIN — METOCLOPRAMIDE HYDROCHLORIDE 10 MG: 5 INJECTION INTRAMUSCULAR; INTRAVENOUS at 10:41

## 2022-02-10 RX ADMIN — OXYTOCIN 20 UNITS: 10 INJECTION, SOLUTION INTRAMUSCULAR; INTRAVENOUS at 11:30

## 2022-02-10 RX ADMIN — Medication 100 MCG: at 11:30

## 2022-02-10 RX ADMIN — OXYTOCIN 10 UNITS: 10 INJECTION, SOLUTION INTRAMUSCULAR; INTRAVENOUS at 11:17

## 2022-02-10 RX ADMIN — KETOROLAC TROMETHAMINE 30 MG: 30 INJECTION, SOLUTION INTRAMUSCULAR; INTRAVENOUS at 23:14

## 2022-02-10 RX ADMIN — Medication 100 MCG: at 11:15

## 2022-02-10 RX ADMIN — MORPHINE SULFATE 0.2 MG: 0.5 INJECTION, SOLUTION EPIDURAL; INTRATHECAL; INTRAVENOUS at 11:09

## 2022-02-10 RX ADMIN — Medication 100 MCG: at 11:02

## 2022-02-10 RX ADMIN — EPHEDRINE SULFATE 10 MG: 50 INJECTION INTRAVENOUS at 11:05

## 2022-02-10 RX ADMIN — SODIUM CHLORIDE, POTASSIUM CHLORIDE, SODIUM LACTATE AND CALCIUM CHLORIDE 1000 ML: 600; 310; 30; 20 INJECTION, SOLUTION INTRAVENOUS at 10:41

## 2022-02-10 RX ADMIN — ONDANSETRON 4 MG: 2 INJECTION INTRAMUSCULAR; INTRAVENOUS at 13:01

## 2022-02-10 RX ADMIN — Medication 100 MCG: at 11:07

## 2022-02-10 RX ADMIN — DOCUSATE SODIUM 100 MG: 100 CAPSULE ORAL at 20:09

## 2022-02-10 RX ADMIN — CEFAZOLIN SODIUM 2 G: 2 INJECTION, SOLUTION INTRAVENOUS at 10:41

## 2022-02-10 RX ADMIN — Medication 100 MCG: at 11:39

## 2022-02-10 RX ADMIN — EPHEDRINE SULFATE 10 MG: 50 INJECTION INTRAVENOUS at 11:33

## 2022-02-10 RX ADMIN — KETOROLAC TROMETHAMINE 30 MG: 30 INJECTION, SOLUTION INTRAMUSCULAR; INTRAVENOUS at 11:49

## 2022-02-10 RX ADMIN — OXYTOCIN 10 UNITS: 10 INJECTION, SOLUTION INTRAMUSCULAR; INTRAVENOUS at 11:22

## 2022-02-10 RX ADMIN — SODIUM CHLORIDE, POTASSIUM CHLORIDE, SODIUM LACTATE AND CALCIUM CHLORIDE 150 ML/HR: 600; 310; 30; 20 INJECTION, SOLUTION INTRAVENOUS at 15:22

## 2022-02-10 RX ADMIN — EPHEDRINE SULFATE 5 MG: 50 INJECTION INTRAVENOUS at 11:30

## 2022-02-10 RX ADMIN — Medication 100 MCG: at 11:36

## 2022-02-10 RX ADMIN — Medication 100 MCG: at 11:33

## 2022-02-10 RX ADMIN — METHYLERGONOVINE MALEATE 200 MCG: 0.2 INJECTION, SOLUTION INTRAMUSCULAR; INTRAVENOUS at 11:26

## 2022-02-10 RX ADMIN — BUPIVACAINE HYDROCHLORIDE 1.2 ML: 7.5 INJECTION, SOLUTION EPIDURAL; RETROBULBAR at 11:09

## 2022-02-10 RX ADMIN — SODIUM CITRATE AND CITRIC ACID MONOHYDRATE 30 ML: 500; 334 SOLUTION ORAL at 10:41

## 2022-02-10 RX ADMIN — KETOROLAC TROMETHAMINE 30 MG: 30 INJECTION, SOLUTION INTRAMUSCULAR; INTRAVENOUS at 18:06

## 2022-02-10 RX ADMIN — ONDANSETRON 4 MG: 2 INJECTION INTRAMUSCULAR; INTRAVENOUS at 18:35

## 2022-02-10 NOTE — ANESTHESIA PREPROCEDURE EVALUATION
Anesthesia Evaluation     Patient summary reviewed and Nursing notes reviewed   no history of anesthetic complications:  NPO Solid Status: > 8 hours  NPO Liquid Status: > 2 hours           Airway   Mallampati: III  TM distance: >3 FB  Neck ROM: full  Possible difficult intubation  Dental      Pulmonary - negative pulmonary ROS and normal exam    breath sounds clear to auscultation  Cardiovascular - negative cardio ROS and normal exam  Exercise tolerance: good (4-7 METS)    Rhythm: regular  Rate: normal        Neuro/Psych- negative ROS  GI/Hepatic/Renal/Endo - negative ROS     Musculoskeletal (-) negative ROS    Abdominal    Substance History - negative use     OB/GYN    (+) Pregnant,         Other - negative ROS                       Anesthesia Plan    ASA 3     spinal   (Discussed risks/benefits of sab, all questions answered)    Anesthetic plan, all risks, benefits, and alternatives have been provided, discussed and informed consent has been obtained with: patient.    Plan discussed with CRNA.        CODE STATUS:    Level Of Support Discussed With: Patient  Code Status (Patient has no pulse and is not breathing): CPR (Attempt to Resuscitate)  Medical Interventions (Patient has pulse or is breathing): Full

## 2022-02-10 NOTE — L&D DELIVERY NOTE
[facility]   Section Operative Note    Pre-Operative Dx:   1.  IUP at Gestational Age: 39w4d  weeks    2.  Previous  delivery      Postoperative dx:    1.  Same     Procedure: Procedure(s):   SECTION REPEAT   Surgeon/Assistant: Surgeon(s):  Janak Gross MD         Anesthesia:  Anesthesiologist: Choice  Anesthesiologist: Antoni Milton MD  CRNA: Colleen Cuello CRNA     EBL: 800 mL mls.                  Antibiotics: cefazolin (Ancef)     Infant:           Gender: male  infant    Weight: 4010 g (8 lb 13.5 oz)     Apgars: 9  @ 1 minute /     9  @ 5 minutes    Cord gases: Venous:    pH, Cord Venous   Date Value Ref Range Status   02/10/2022 7.304 (L) 7.310 - 7.370 pH Units Final        Arterial:    pH, Cord Arterial   Date Value Ref Range Status   02/10/2022 7.29 7.21 - 7.31 pH Units Final          Procedure Details:   After reviewing the informed consent, the patient expressed her understanding and desire to proceed.  She was taken to the operating room with an IV in place and running.  She was placed on the operating table in the sitting position.  The patient was given a spinal anesthetic.  She was placed in the dorsal supine position with leftward tilt.  A sterile Caraballo catheter was inserted into the patient's bladder.  Just before the patient's draping she became very anxious and uncooperative.  She was shaking and wriggling around the bed and it took several minutes to get her calm down enough to drink her.  The patient was then prepped and draped in the usual sterile fashion. After a surgical timeout was performed, and the patient's anesthesia was found to be adequate I made a Pfannenstiel skin incision with the scalpel.  The incision was carried down to the underlying fascia with the cautery. The fascia was nicked in the midline. The fascia was extended laterally, in a curvilinear fashion with Najera scissors.  I used 2 kochers to grasp the superior portion of the fascia, and this was  taken off the rectus muscle sharply.  The kochers were removed and replaced on the posterior portion of the fascia. The fascia was taken off the rectus muscle sharply.  At this point in the procedure the patient once again became very anxious and uncooperative.  She was once again wriggling her body around making it difficult for me to proceed with the surgery.  Again, took a couple of minutes to get her calm down enough to proceed.  The midline was identified, tented up with 2 hemostats, and entered sharply.  The peritoneum was extended in a sharp fashion with good visualization of the bladder.  As I was taking down the peritoneum there was some adhesions from the uterus to the anterior abdominal wall but these were easily taken down and were well above the bladder.  The Deondre retractor was placed.  The bladder flap was developed sharply.  I made a low transverse uterine incision with the scalpel.  The uterine cavity was entered bluntly, and membranes were artificially ruptured.  The fluid color was clear.  The fetal head was gently lifted to the hysterotomy, and the baby was delivered with gentle fundal pressure at 11:17 AM.  After complete delivery of the infant, the mouth and nose were bulb suctioned, the cord was doubly clamped and cut, and the baby was passed off to the awaiting nurses. Apgars were 9 and 9 at 1 and 5 minutes respectively. The baby's birth weight was 8 pounds 13 ounces. Cord blood and gases were collected, and the placenta was delivered manually and intact at 11:18 AM.  The uterus was then exteriorized, and the endometrium was curetted with a dry lap.  As I curetted the endometrium I noted an indention in the anterior wall of the uterus well above my hysterotomy, which I suspect was the patient's prior hysterotomy.  This was not noted the time of the delivery, I suspect due to distention of the anterior lower uterine segment from the fetal head.  This was well above the lower segment and in  the more muscular part of the myometrium.  There was no defect internally, and it appeared well-healed.  The hysterotomy was then closed in 2 layers.  The first layer was closed with a running 0 Monocryl suture.  The second layer was closed with a horizontal imbricating 0 Monocryl suture.  There was excellent hemostasis after closure of both layers.  I irrigated posterior to the uterus.  The uterus was reinserted into the peritoneal cavity.  The paracolic gutters were copiously irrigated.  The hysterotomy was reinspected, and noted to be hemostatic.  3 Lydia clamps were used to grasp the peritoneum, and the peritoneum was closed with a 3-0 Monocryl suture in a running fashion.  The rectus muscle was reapproximated with 3 interrupted horizontal mattress sutures using a #1 chromic suture.  The fascia was then closed in a running fashion using a 0 Vicryl suture.  The subcutaneous spaces were copiously irrigated, and hemostasis was achieved with the cautery.  The subcutaneous space was then reapproximated with a 3-0 Monocryl suture in a running fashion.  The skin was closed with a 4-0 Monocryl suture in a subcuticular fashion.  A sterile towels applied over the incision.  The patient was placed in a frog-leg position, and the drapes were removed.  I expressed any remaining blood and clot from the uterus.  A sterile bandage was applied to the incision, and the patient was transferred to the recovery room in satisfactory condition.  The patient received Kefzol as her preoperative antibiotics.  All counts were correct x2 at the end of the procedure.  Both mother and  are recovering in excellent condition in the recovery area.      Complications:   None      Janak Gross MD  2/10/2022  12:37 EST

## 2022-02-10 NOTE — ANESTHESIA PROCEDURE NOTES
Spinal Block      Patient location during procedure: OR  Indication:at surgeon's request  Performed By  CRNA: Colleen Cuello CRNA  Preanesthetic Checklist  Completed: patient identified, IV checked, site marked, risks and benefits discussed, surgical consent, monitors and equipment checked, pre-op evaluation and timeout performed  Spinal Block Prep:  Patient Position:sitting  Sterile Tech:gloves, cap, mask and sterile barriers  Prep:Betadine  Patient Monitoring:continuous pulse oximetry, EKG and blood pressure monitoring  Spinal Block Procedure  Approach:midline  Guidance:landmark technique  Location:L3-L4  Needle Type:Pencan  Needle Gauge:25 G  Placement of Spinal needle event:cerebrospinal fluid aspirated  Paresthesia: transient  Fluid Appearance:clear  Medications: morphine PF (DURAMORPH) injection, 0.2 mg  bupivacaine PF (MARCAINE) injection 0.75%, 1.2 mL

## 2022-02-10 NOTE — PLAN OF CARE
Problem: Bleeding ( Delivery)  Goal: Bleeding is Controlled  Outcome: Met     Problem: Change in Fetal Wellbeing ( Delivery)  Goal: Stable Fetal Wellbeing  Outcome: Met     Problem: Infection ( Delivery)  Goal: Absence of Infection Signs and Symptoms  Outcome: Met     Problem: Respiratory Compromise ( Delivery)  Goal: Effective Oxygenation and Ventilation  Outcome: Met   Goal Outcome Evaluation:

## 2022-02-11 LAB
BASOPHILS # BLD AUTO: 0.03 10*3/MM3 (ref 0–0.2)
BASOPHILS NFR BLD AUTO: 0.2 % (ref 0–1.5)
DEPRECATED RDW RBC AUTO: 43.9 FL (ref 37–54)
EOSINOPHIL # BLD AUTO: 0 10*3/MM3 (ref 0–0.4)
EOSINOPHIL NFR BLD AUTO: 0 % (ref 0.3–6.2)
ERYTHROCYTE [DISTWIDTH] IN BLOOD BY AUTOMATED COUNT: 15.5 % (ref 12.3–15.4)
HCT VFR BLD AUTO: 32.9 % (ref 34–46.6)
HGB BLD-MCNC: 10.8 G/DL (ref 12–15.9)
IMM GRANULOCYTES # BLD AUTO: 0.08 10*3/MM3 (ref 0–0.05)
IMM GRANULOCYTES NFR BLD AUTO: 0.5 % (ref 0–0.5)
LYMPHOCYTES # BLD AUTO: 1.89 10*3/MM3 (ref 0.7–3.1)
LYMPHOCYTES NFR BLD AUTO: 11.6 % (ref 19.6–45.3)
MCH RBC QN AUTO: 26 PG (ref 26.6–33)
MCHC RBC AUTO-ENTMCNC: 32.8 G/DL (ref 31.5–35.7)
MCV RBC AUTO: 79.3 FL (ref 79–97)
MONOCYTES # BLD AUTO: 1.6 10*3/MM3 (ref 0.1–0.9)
MONOCYTES NFR BLD AUTO: 9.8 % (ref 5–12)
NEUTROPHILS NFR BLD AUTO: 12.72 10*3/MM3 (ref 1.7–7)
NEUTROPHILS NFR BLD AUTO: 77.9 % (ref 42.7–76)
NRBC BLD AUTO-RTO: 0 /100 WBC (ref 0–0.2)
PLATELET # BLD AUTO: 193 10*3/MM3 (ref 140–450)
PMV BLD AUTO: 11.3 FL (ref 6–12)
RBC # BLD AUTO: 4.15 10*6/MM3 (ref 3.77–5.28)
WBC NRBC COR # BLD: 16.32 10*3/MM3 (ref 3.4–10.8)

## 2022-02-11 PROCEDURE — 0503F POSTPARTUM CARE VISIT: CPT | Performed by: OBSTETRICS & GYNECOLOGY

## 2022-02-11 PROCEDURE — 85025 COMPLETE CBC W/AUTO DIFF WBC: CPT | Performed by: OBSTETRICS & GYNECOLOGY

## 2022-02-11 PROCEDURE — 25010000002 KETOROLAC TROMETHAMINE PER 15 MG: Performed by: NURSE ANESTHETIST, CERTIFIED REGISTERED

## 2022-02-11 RX ORDER — IBUPROFEN 600 MG/1
600 TABLET ORAL EVERY 6 HOURS SCHEDULED
Qty: 60 TABLET | Refills: 1 | Status: SHIPPED | OUTPATIENT
Start: 2022-02-11

## 2022-02-11 RX ORDER — HYDROCODONE BITARTRATE AND ACETAMINOPHEN 5; 325 MG/1; MG/1
1 TABLET ORAL EVERY 6 HOURS PRN
Qty: 24 TABLET | Refills: 0 | Status: SHIPPED | OUTPATIENT
Start: 2022-02-11

## 2022-02-11 RX ORDER — PSEUDOEPHEDRINE HCL 30 MG
100 TABLET ORAL 2 TIMES DAILY
Qty: 60 CAPSULE | Refills: 1 | Status: SHIPPED | OUTPATIENT
Start: 2022-02-11

## 2022-02-11 RX ADMIN — KETOROLAC TROMETHAMINE 30 MG: 30 INJECTION, SOLUTION INTRAMUSCULAR; INTRAVENOUS at 12:33

## 2022-02-11 RX ADMIN — DOCUSATE SODIUM 100 MG: 100 CAPSULE ORAL at 08:34

## 2022-02-11 RX ADMIN — DOCUSATE SODIUM 100 MG: 100 CAPSULE ORAL at 20:06

## 2022-02-11 RX ADMIN — VITAMIN A, VITAMIN C, VITAMIN D, VITAMIN E, THIAMINE, RIBOFLAVIN, NIACIN, VITAMIN B6, FOLIC ACID, VITAMIN B12, CALCIUM, IRON, ZINC, COPPER 1 TABLET: 4000; 120; 400; 22; 1.84; 3; 20; 10; 1; 12; 200; 27; 25; 2 TABLET ORAL at 08:34

## 2022-02-11 RX ADMIN — SODIUM CHLORIDE, POTASSIUM CHLORIDE, SODIUM LACTATE AND CALCIUM CHLORIDE 150 ML/HR: 600; 310; 30; 20 INJECTION, SOLUTION INTRAVENOUS at 05:00

## 2022-02-11 RX ADMIN — IBUPROFEN 600 MG: 600 TABLET ORAL at 17:21

## 2022-02-11 RX ADMIN — KETOROLAC TROMETHAMINE 30 MG: 30 INJECTION, SOLUTION INTRAMUSCULAR; INTRAVENOUS at 05:43

## 2022-02-11 NOTE — ANESTHESIA POSTPROCEDURE EVALUATION
Patient: Meliton Pepe    Procedure Summary     Date: 02/10/22 Room / Location: [facility] LABOR DELIVERY  [facility] LABOR DELIVERY    Anesthesia Start: 1052 Anesthesia Stop: 1200    Procedure:  SECTION REPEAT (N/A Abdomen) Diagnosis: (REPEAT SECTION, 39/6)    Surgeons: Janak Gross MD Provider: Antoni Milton MD    Anesthesia Type: spinal ASA Status: 3          Anesthesia Type: spinal    Vitals  Vitals Value Taken Time   /67 22 0548   Temp 36.5 °C (97.7 °F) 22 0548   Pulse 90 22 0548   Resp 18 22 0130   SpO2 96 % 02/10/22 1500           Post Anesthesia Care and Evaluation    Patient location during evaluation: bedside  Patient participation: complete - patient participated  Level of consciousness: awake  Pain score: 0  Pain management: adequate  Airway patency: patent  Anesthetic complications: No anesthetic complications  PONV Status: none  Cardiovascular status: acceptable and stable  Respiratory status: acceptable and room air  Hydration status: acceptable  Post Neuraxial Block status: Motor and sensory function returned to baseline and No signs or symptoms of PDPH

## 2022-02-11 NOTE — DISCHARGE SUMMARY
[facility]         DISCHARGE SUMMARY    Patient Name: Meliton Pepe  : 2000  MRN: [id number]    Date of Admission: 2/10/2022  Date of Discharge: 2022  Primary Care Physician: Provider, No Known    Consults     No orders found for last 30 day(s).           Procedures:  Repeat low transverse  delivery    Presenting Problem:   Previous  delivery affecting pregnancy [O34.219]    Admitting Diagnosis:  21-year-old  2 para 1 at 39 weeks and 4 days gestation  Prior  delivery  Obesity    Discharge Diagnosis:  21-year-old  2 para 1 at 39 weeks and 4 days gestation  Prior  delivery  Obesity  Repeat low-transverse  delivery 2 layer uterine closure    Delivery Summary     OB Surgeon:  Janak Gross MD  Anesthesia: Spinal  Delivery Type:   Perineum: OBPERINEUM: NA  Feeding method: Bottle    Infant: male  infant;    Weight: 4010 g (8 lb 13.5 oz)     APGARS: 9  @ 1 minute / 9  @ 5 minutes   Venous Blood Gas:   pH, Cord Venous   Date Value Ref Range Status   02/10/2022 7.304 (L) 7.310 - 7.370 pH Units Final      Arterial Blood Gas:   pH, Cord Arterial   Date Value Ref Range Status   02/10/2022 7.29 7.21 - 7.31 pH Units Final        Hospital Course     Hospital Course:  Meliton Pepe is a 21 y.o.  39w4d who presented on 2/10/2022 for a repeat  delivery.  For full details of the procedure please see the separate dictated operative narrative.  After initial recovery in the PACU the patient was transferred to the postpartum unit for further postpartum care.  She did well.  On postpartum day 1 her labs are stable.  Her catheter was discontinued and she was able to void without difficulty.  By day of discharge she been afebrile stable vital signs throughout the hospital stay.  She was voiding without difficulty.  Her pain was well controlled.  She was tolerating a regular diet without nausea or vomiting.  Her baby was  doing well.  She desired discharge home.    Day of Discharge     Vital Signs:  Temp:  [97.6 °F (36.4 °C)-98.8 °F (37.1 °C)] 97.7 °F (36.5 °C)  Heart Rate:  [] 90  Resp:  [14-18] 18  BP: ()/(50-80) 118/67    Pertinent  and/or Most Recent Results     LAB RESULTS:       Lab 02/11/22  0441 02/10/22  1004   WBC 16.32* 12.46*   HEMOGLOBIN 10.8* 11.9*   HEMATOCRIT 32.9* 36.6   PLATELETS 193 208   NEUTROS ABS 12.72* 8.78*   IMMATURE GRANS (ABS) 0.08* 0.09*   LYMPHS ABS 1.89 2.39   MONOS ABS 1.60* 1.15*   EOS ABS 0.00 0.01   MCV 79.3 78.4*                 Lab 02/10/22  1004   ABO TYPING B   RH TYPING Positive   ANTIBODY SCREEN Negative     URINALYSIS@  Microbiology Results (last 10 days)     Procedure Component Value - Date/Time    COVID PRE-OP / PRE-PROCEDURE SCREENING ORDER (NO ISOLATION) - Swab, Nasopharynx [[id number]]  (Normal) Collected: 02/07/22 1619    Lab Status: Final result Specimen: Swab from Nasopharynx Updated: 02/08/22 0029    Narrative:      The following orders were created for panel order COVID PRE-OP / PRE-PROCEDURE SCREENING ORDER (NO ISOLATION) - Swab, Nasopharynx.  Procedure                               Abnormality         Status                     ---------                               -----------         ------                     COVID-19,APTIMA PANTHER(...[[id number]]  Normal              Final result                 Please view results for these tests on the individual orders.    COVID-19,APTIMA PANTHER(LUCHO),[facility] LISA/[facility] HEIDY, NP/OP SWAB IN UTM/VTM/SALINE TRANSPORT MEDIA,24 HR TAT - Swab, Nasopharynx [[id number]]  (Normal) Collected: 02/07/22 1619    Lab Status: Final result Specimen: Swab from Nasopharynx Updated: 02/08/22 0029     COVID19 Not Detected    Narrative:      Fact sheet for providers: https://www.fda.gov/media/971098/download     Fact sheet for patients: https://www.fda.gov/media/270295/download    Test performed by RT PCR.             Discharge Details        Discharge  Medications      New Medications      Instructions Start Date   docusate sodium 100 MG capsule   100 mg, Oral, 2 Times Daily      HYDROcodone-acetaminophen 5-325 MG per tablet  Commonly known as: NORCO   1 tablet, Oral, Every 6 Hours PRN      ibuprofen 600 MG tablet  Commonly known as: ADVIL,MOTRIN   600 mg, Oral, Every 6 Hours Scheduled         Continue These Medications      Instructions Start Date   Prenatal Multi +DHA 27-0.8-200 MG capsule   1 tablet, Oral, Daily             No Known Allergies    Discharge Disposition:   Home, self-care    Discharge Condition:  Good    Diet:   Regular    Discharge Activity:   Pelvic rest for 6 weeks, no intercourse for 6 weeks, no tampons or douching for 6 weeks, nothing in the vagina for 6 weeks  No driving for 2 weeks  No lifting more than 15 to 20 pounds for 2 weeks  No tub baths for 2 weeks, but may shower  Keep the incision clean and dry    Call the office or go to the emergency department for temperature greater than 100 °F, shortness of breath or chest pain, excessive nausea or vomiting, pain that is worsening despite current pain medications, redness, swelling, or drainage from the incision site, vaginal bleeding soaking a pad in less than 1 hour.    Follow Up:  5 weeks with Dr. Goodman Jono Gramajo MD

## 2022-02-11 NOTE — PLAN OF CARE
Problem: Adult Inpatient Plan of Care  Goal: Plan of Care Review  Outcome: Ongoing, Progressing  Goal: Patient-Specific Goal (Individualized)  Outcome: Ongoing, Progressing  Goal: Absence of Hospital-Acquired Illness or Injury  Outcome: Ongoing, Progressing  Intervention: Identify and Manage Fall Risk  Recent Flowsheet Documentation  Taken 2/10/2022 2030 by Nanette Martin RN  Safety Promotion/Fall Prevention: safety round/check completed  Intervention: Prevent and Manage VTE (venous thromboembolism) Risk  Recent Flowsheet Documentation  Taken 2/10/2022 2030 by Nanette Martin RN  VTE Prevention/Management:   compression stockings on   foot pump device on   bilateral  Goal: Optimal Comfort and Wellbeing  Outcome: Ongoing, Progressing  Goal: Readiness for Transition of Care  Outcome: Ongoing, Progressing     Problem: Adjustment to Role Transition (Postpartum  Delivery)  Goal: Successful Maternal Role Transition  Outcome: Ongoing, Progressing     Problem: Bleeding (Postpartum  Delivery)  Goal: Hemostasis  Outcome: Ongoing, Progressing     Problem: Infection (Postpartum  Delivery)  Goal: Absence of Infection Signs and Symptoms  Outcome: Ongoing, Progressing     Problem: Pain (Postpartum  Delivery)  Goal: Acceptable Pain Control  Outcome: Ongoing, Progressing     Problem: Postoperative Nausea and Vomiting (Postpartum  Delivery)  Goal: Nausea and Vomiting Relief  Outcome: Ongoing, Progressing  Intervention: Prevent or Manage Postoperative Nausea and Vomiting  Recent Flowsheet Documentation  Taken 2/10/2022 2030 by Nanette Martin RN  Nausea/Vomiting Interventions: cool cloth applied     Problem: Postoperative Urinary Retention (Postpartum  Delivery)  Goal: Effective Urinary Elimination  Outcome: Ongoing, Progressing   Goal Outcome Evaluation:

## 2022-02-11 NOTE — PLAN OF CARE
Problem: Adult Inpatient Plan of Care  Goal: Plan of Care Review  Outcome: Ongoing, Progressing  Goal: Patient-Specific Goal (Individualized)  Outcome: Ongoing, Progressing  Goal: Absence of Hospital-Acquired Illness or Injury  Outcome: Ongoing, Progressing  Goal: Optimal Comfort and Wellbeing  Outcome: Ongoing, Progressing  Goal: Readiness for Transition of Care  Outcome: Ongoing, Progressing     Problem: Adjustment to Role Transition (Postpartum  Delivery)  Goal: Successful Maternal Role Transition  Outcome: Ongoing, Progressing     Problem: Bleeding (Postpartum  Delivery)  Goal: Hemostasis  Outcome: Ongoing, Progressing     Problem: Infection (Postpartum  Delivery)  Goal: Absence of Infection Signs and Symptoms  Outcome: Ongoing, Progressing     Problem: Pain (Postpartum  Delivery)  Goal: Acceptable Pain Control  Outcome: Ongoing, Progressing     Problem: Postoperative Nausea and Vomiting (Postpartum  Delivery)  Goal: Nausea and Vomiting Relief  Outcome: Ongoing, Progressing     Problem: Postoperative Urinary Retention (Postpartum  Delivery)  Goal: Effective Urinary Elimination  Outcome: Ongoing, Progressing   Goal Outcome Evaluation:

## 2022-02-11 NOTE — PROGRESS NOTES
MARLEE Gandhi   PROGRESS NOTE    Post-Op Day 1 S/P     Subjective:  Patient has no complaints  Pain controlled  Tolerating a regular diet  Not passing flatus  Ambulating  Urinating spontaneously  Lochia decreasing, no bleeding concerns  No lightheadedness or dizziness    Objective    Temp:  [97.6 °F (36.4 °C)-98.8 °F (37.1 °C)] 97.7 °F (36.5 °C)  Heart Rate:  [] 90  Resp:  [14-18] 18  BP: ()/(50-80) 118/67     Physical Exam:  GEN: alert and in no distress  Abdomen: fundus firm - below the umbilicus  abdomen soft + BS's  Appropriately tender to palpation around the incision  Incision: dressed  Extremi1+ edema, no redness or tenderness in the calves or thighsties:     Labs:  Lab Results (last 24 hours)     Procedure Component Value Units Date/Time    CBC & Differential [[id number]]  (Abnormal) Collected: 02/10/22 100    Specimen: Blood Updated: 02/10/22 1024    Narrative:      The following orders were created for panel order CBC & Differential.  Procedure                               Abnormality         Status                     ---------                               -----------         ------                     CBC Auto Differential[[id number]]        Abnormal            Final result                 Please view results for these tests on the individual orders.    CBC Auto Differential [[id number]]  (Abnormal) Collected: 02/10/22 1004    Specimen: Blood Updated: 02/10/22 1024     WBC 12.46 10*3/mm3      RBC 4.67 10*6/mm3      Hemoglobin 11.9 g/dL      Hematocrit 36.6 %      MCV 78.4 fL      MCH 25.5 pg      MCHC 32.5 g/dL      RDW 15.5 %      RDW-SD 43.5 fl      MPV 11.1 fL      Platelets 208 10*3/mm3      Neutrophil % 70.5 %      Lymphocyte % 19.2 %      Monocyte % 9.2 %      Eosinophil % 0.1 %      Basophil % 0.3 %      Immature Grans % 0.7 %      Neutrophils, Absolute 8.78 10*3/mm3      Lymphocytes, Absolute 2.39 10*3/mm3      Monocytes, Absolute 1.15 10*3/mm3      Eosinophils, Absolute  0.01 10*3/mm3      Basophils, Absolute 0.04 10*3/mm3      Immature Grans, Absolute 0.09 10*3/mm3      nRBC 0.0 /100 WBC     Blood Gas, Arterial, Cord [[id number]]  (Abnormal) Collected: 02/10/22 1112    Specimen: Cord Blood Arterial from Umbilical Cord Updated: 02/10/22 1137     pH, Cord Arterial 7.29 pH Units      pCO2, Cord Arterial 54.1 mmHg      Base Exc, Cord Arterial -1.9 mmol/L      pO2, Cord Arterial <40.5 mmHg      HCO3, Cord Arterial 25.5 mmol/L     Blood Gas, Venous, Cord [[id number]]  (Abnormal) Collected: 02/10/22 1112    Specimen: Cord Blood Venous from Umbilical Cord Updated: 02/10/22 1135     pH, Cord Venous 7.304 pH Units      pCO2, Cord Venous 49.7 mm Hg      pO2, Cord Venous <40.5 mm Hg      Base Excess, Cord Venous -2.7 mmol/L      HCO3, Cord Venous 24.1 mmol/L     CBC & Differential [[id number]]  (Abnormal) Collected: 02/11/22 0441    Specimen: Blood Updated: 02/11/22 0451    Narrative:      The following orders were created for panel order CBC & Differential.  Procedure                               Abnormality         Status                     ---------                               -----------         ------                     CBC Auto Differential[[id number]]        Abnormal            Final result                 Please view results for these tests on the individual orders.    CBC Auto Differential [[id number]]  (Abnormal) Collected: 02/11/22 0441    Specimen: Blood Updated: 02/11/22 0451     WBC 16.32 10*3/mm3      RBC 4.15 10*6/mm3      Hemoglobin 10.8 g/dL      Hematocrit 32.9 %      MCV 79.3 fL      MCH 26.0 pg      MCHC 32.8 g/dL      RDW 15.5 %      RDW-SD 43.9 fl      MPV 11.3 fL      Platelets 193 10*3/mm3      Neutrophil % 77.9 %      Lymphocyte % 11.6 %      Monocyte % 9.8 %      Eosinophil % 0.0 %      Basophil % 0.2 %      Immature Grans % 0.5 %      Neutrophils, Absolute 12.72 10*3/mm3      Lymphocytes, Absolute 1.89 10*3/mm3      Monocytes, Absolute 1.60 10*3/mm3       Eosinophils, Absolute 0.00 10*3/mm3      Basophils, Absolute 0.03 10*3/mm3      Immature Grans, Absolute 0.08 10*3/mm3      nRBC 0.0 /100 WBC              Assessment/Plan         delivery delivered    Previous  delivery, antepartum    Obesity affecting pregnancy, antepartum    Previous  delivery affecting pregnancy    Assessment & Plan    Assessment:    Meliton Pepe is Day 1  post-partum  , Low Transverse   .      Plan:    Routine postpartum/postop care    Advance diet, Ambulate, Saline lock IV, Remove bandage, Shower, PO pain meds, Importance of wound care/keep clean and dry, Breast feeding support, Follow up scheduled        Janak Gross MD  22  07:15 EST

## 2022-02-11 NOTE — PLAN OF CARE
Problem: Adult Inpatient Plan of Care  Goal: Plan of Care Review  2022 0558 by Nanette Martin RN  Outcome: Ongoing, Progressing  2022 0557 by Nanette Martin RN  Outcome: Ongoing, Progressing  Goal: Patient-Specific Goal (Individualized)  2022 0558 by Nanette Martin RN  Outcome: Ongoing, Progressing  2022 05 by Nanette Martin RN  Outcome: Ongoing, Progressing  Goal: Absence of Hospital-Acquired Illness or Injury  2022 0558 by Nanette Martin RN  Outcome: Ongoing, Progressing  2022 0557 by Nanette Martin RN  Outcome: Ongoing, Progressing  Intervention: Identify and Manage Fall Risk  Recent Flowsheet Documentation  Taken 2/10/2022 2030 by Nanette Martin RN  Safety Promotion/Fall Prevention: safety round/check completed  Intervention: Prevent and Manage VTE (venous thromboembolism) Risk  Recent Flowsheet Documentation  Taken 2/10/2022 2030 by Nanette Martin RN  VTE Prevention/Management:   compression stockings on   foot pump device on   bilateral  Goal: Optimal Comfort and Wellbeing  2022 05 by Nanette Martin RN  Outcome: Ongoing, Progressing  2022 0557 by Nanette Martin RN  Outcome: Ongoing, Progressing  Goal: Readiness for Transition of Care  2022 05 by Nanette Martin RN  Outcome: Ongoing, Progressing  2022 0557 by Nanette Martin RN  Outcome: Ongoing, Progressing     Problem: Adjustment to Role Transition (Postpartum  Delivery)  Goal: Successful Maternal Role Transition  2022 0558 by Nanette Martin RN  Outcome: Ongoing, Progressing  2022 0557 by Nanette Martin RN  Outcome: Ongoing, Progressing     Problem: Bleeding (Postpartum  Delivery)  Goal: Hemostasis  2022 0558 by Nanette Martin RN  Outcome: Ongoing, Progressing  2022 05 by Nanette Martin RN  Outcome: Ongoing, Progressing     Problem: Infection (Postpartum  Delivery)  Goal: Absence of Infection Signs and Symptoms  2022 0558 by Nanette Martin RN  Outcome: Ongoing, Progressing  2022 0557 by Nanette Martin  RN  Outcome: Ongoing, Progressing     Problem: Pain (Postpartum  Delivery)  Goal: Acceptable Pain Control  2022 0558 by Nanette Martin RN  Outcome: Ongoing, Progressing  2022 05 by Nanette Martin RN  Outcome: Ongoing, Progressing     Problem: Postoperative Nausea and Vomiting (Postpartum  Delivery)  Goal: Nausea and Vomiting Relief  2022 05 by Nanette Martin RN  Outcome: Ongoing, Progressing  2022 05 by Nanette Martin RN  Outcome: Ongoing, Progressing  Intervention: Prevent or Manage Postoperative Nausea and Vomiting  Recent Flowsheet Documentation  Taken 2/10/2022 2030 by Nanette Martin RN  Nausea/Vomiting Interventions: cool cloth applied     Problem: Postoperative Urinary Retention (Postpartum  Delivery)  Goal: Effective Urinary Elimination  2022 05 by Nanette Martin RN  Outcome: Ongoing, Progressing  2022 05 by Nanette Martin RN  Outcome: Ongoing, Progressing   Goal Outcome Evaluation:

## 2022-02-12 VITALS
HEIGHT: 61 IN | WEIGHT: 234 LBS | RESPIRATION RATE: 18 BRPM | HEART RATE: 90 BPM | OXYGEN SATURATION: 96 % | TEMPERATURE: 97.8 F | DIASTOLIC BLOOD PRESSURE: 75 MMHG | BODY MASS INDEX: 44.18 KG/M2 | SYSTOLIC BLOOD PRESSURE: 120 MMHG

## 2022-02-12 PROBLEM — O26.849 FETAL SIZE INCONSISTENT WITH DATES: Status: RESOLVED | Noted: 2021-12-02 | Resolved: 2022-02-12

## 2022-02-12 PROCEDURE — 0503F POSTPARTUM CARE VISIT: CPT | Performed by: STUDENT IN AN ORGANIZED HEALTH CARE EDUCATION/TRAINING PROGRAM

## 2022-02-12 RX ADMIN — DOCUSATE SODIUM 100 MG: 100 CAPSULE ORAL at 09:02

## 2022-02-12 RX ADMIN — VITAMIN A, VITAMIN C, VITAMIN D, VITAMIN E, THIAMINE, RIBOFLAVIN, NIACIN, VITAMIN B6, FOLIC ACID, VITAMIN B12, CALCIUM, IRON, ZINC, COPPER 1 TABLET: 4000; 120; 400; 22; 1.84; 3; 20; 10; 1; 12; 200; 27; 25; 2 TABLET ORAL at 09:01

## 2022-02-12 RX ADMIN — IBUPROFEN 600 MG: 600 TABLET ORAL at 00:39

## 2022-02-12 RX ADMIN — IBUPROFEN 600 MG: 600 TABLET ORAL at 06:02

## 2022-02-12 NOTE — PROGRESS NOTES
MARLEE Gandhi   PROGRESS NOTE    Post-Op Day 2 S/P     Subjective:  Patient has no complaints  Pain controlled  Tolerating a regular diet  Passing flatus  Ambulating  Urinating spontaneously  Lochia decreasing, no bleeding concerns  No lightheadedness or dizziness  Desires DC home if baby Dc'd    Objective    Temp:  [98.2 °F (36.8 °C)-98.4 °F (36.9 °C)] 98.3 °F (36.8 °C)  Heart Rate:  [] 84  Resp:  [16-18] 18  BP: ()/(62-90) 98/62     Physical Exam:  GEN: alert and in no distress  Abdomen: fundus firm - below the umbilicus  abdomen soft + BS's  Appropriately tender to palpation around the incision  Incision: separation approximately 4 cm from left lateral edge. Raw, small amount of blood staining.  Steri strip to be applied  Extremi1+ edema, no redness or tenderness in the calves or thighsties:     Labs:  Lab Results (last 24 hours)     ** No results found for the last 24 hours. **             Assessment/Plan         delivery delivered    Previous  delivery, antepartum    Obesity affecting pregnancy, antepartum    Previous  delivery affecting pregnancy    Assessment & Plan    Assessment:    Meliton Pepe is Day 2  post-partum  , Low Transverse   .      Plan:    Routine postpartum/postop care    Advance diet, Ambulate, Remove IV, Remove bandage, Shower, PO pain meds, Importance of wound care/keep clean and dry, Breast feeding support, Follow up scheduled  D/C home today         Jono Gramajo MD  22  09:20 EST

## 2022-02-12 NOTE — PLAN OF CARE
Problem: Adult Inpatient Plan of Care  Goal: Plan of Care Review  Outcome: Ongoing, Progressing  Goal: Patient-Specific Goal (Individualized)  Outcome: Ongoing, Progressing  Goal: Absence of Hospital-Acquired Illness or Injury  Outcome: Ongoing, Progressing  Intervention: Identify and Manage Fall Risk  Recent Flowsheet Documentation  Taken 2022 by Darling Scott RN  Safety Promotion/Fall Prevention: safety round/check completed  Goal: Optimal Comfort and Wellbeing  Outcome: Ongoing, Progressing  Intervention: Provide Person-Centered Care  Recent Flowsheet Documentation  Taken 2022 by Darling Scott RN  Trust Relationship/Rapport:   care explained   choices provided   questions answered   questions encouraged   thoughts/feelings acknowledged  Goal: Readiness for Transition of Care  Outcome: Ongoing, Progressing     Problem: Adjustment to Role Transition (Postpartum  Delivery)  Goal: Successful Maternal Role Transition  Outcome: Ongoing, Progressing     Problem: Bleeding (Postpartum  Delivery)  Goal: Hemostasis  Outcome: Ongoing, Progressing     Problem: Infection (Postpartum  Delivery)  Goal: Absence of Infection Signs and Symptoms  Outcome: Ongoing, Progressing     Problem: Pain (Postpartum  Delivery)  Goal: Acceptable Pain Control  Outcome: Ongoing, Progressing     Problem: Postoperative Nausea and Vomiting (Postpartum  Delivery)  Goal: Nausea and Vomiting Relief  Outcome: Ongoing, Progressing     Problem: Postoperative Urinary Retention (Postpartum  Delivery)  Goal: Effective Urinary Elimination  Outcome: Ongoing, Progressing   Goal Outcome Evaluation:

## 2022-02-12 NOTE — PLAN OF CARE
Problem: Adult Inpatient Plan of Care  Goal: Plan of Care Review  Outcome: Met  Goal: Patient-Specific Goal (Individualized)  Outcome: Met  Goal: Absence of Hospital-Acquired Illness or Injury  Outcome: Met  Goal: Optimal Comfort and Wellbeing  Outcome: Met  Goal: Readiness for Transition of Care  Outcome: Met     Problem: Adjustment to Role Transition (Postpartum  Delivery)  Goal: Successful Maternal Role Transition  Outcome: Met     Problem: Bleeding (Postpartum  Delivery)  Goal: Hemostasis  Outcome: Met     Problem: Infection (Postpartum  Delivery)  Goal: Absence of Infection Signs and Symptoms  Outcome: Met     Problem: Pain (Postpartum  Delivery)  Goal: Acceptable Pain Control  Outcome: Met     Problem: Postoperative Nausea and Vomiting (Postpartum  Delivery)  Goal: Nausea and Vomiting Relief  Outcome: Met     Problem: Postoperative Urinary Retention (Postpartum  Delivery)  Goal: Effective Urinary Elimination  Outcome: Met   Goal Outcome Evaluation:

## 2023-01-06 NOTE — PROGRESS NOTES
OB FOLLOW UP        Chief Complaint   Patient presents with   • Routine Prenatal Visit       Subjective:   • No complaints  • CS date pt got on paperwork is not date pt thought CS was    Objective:  /64   Wt 97.7 kg (215 lb 6.4 oz)   LMP 2021 (Exact Date)   BMI 40.70 kg/m²  9.253 kg (20 lb 6.4 oz)  Uterine Size: size equals dates  FHT: 110-160 BPM    See OB flow for LE edema, cvx exam if performed, and Upro/Uglu    Assessment and Plan:  27w4d  Reassuring pregnancy progress.  Questions answered.  Diagnoses and all orders for this visit:    1. Supervision of other normal pregnancy, antepartum (Primary)  Overview:  EDC 22 by LMP and 14week US    Initial prenatal care at [facility]    Covid vaccine-recommended  Flu vaccine-recommended  Tdap vaccine-2021 recommended    24-28 weeks:  • Rhogam: B+  • ?BTL/Bilat salpingectomy: No    Third Trimester:  • GBS:  • Breast pump:    PROBLEM LIST/PLAN:   Previous  delivery-[facility] 2/10/2022 Dr. CAMPBELL    2021 will confirm date w Christie two separate dates noted in EPIC  Obesity    Orders:  -     POC Urinalysis Dipstick  -  1hr Glucola and CBC    Counseling:    • OB precautions, leaking, VB, curry bermeo vs PTL/Labor  • FKC  • Discussed importance of VACCINES in pregnancy.  Maternal and fetal risk of not being vaccinated reviewed.  Significant maternal and fetal/infant benefit.  FDA approval and ACOG/SMFM/CDC strong recommendation in pregnancy.  Questions answered.  • Continue PNV.  Importance of healthy eating and staying active.    Return in about 2 weeks (around 2021) for Follow up OB [id number] to 36weeks.        Erin Tucker, DO  2021    [facility] [place] [facility]  [address] [place] [address] [address] [address]  Dept: [phone]  Dept Fax: [phone]  Loc: [phone]  Loc Fax: [phone]      [FreeTextEntry1] : Mr. Moe is a 89-year-old man with history of multiple myeloma, carotid artery disease s/p L CEA, TIA, DM2, HTN, CKD3, and HLD presenting for follow up.\par \par Impression:\par (1) Dyspnea on exertion: likely multifactorial and related to anemia and deconditioning. Given history of atherosclerotic disease cannot exclude significant coronary disease. Would proceed with pharmacologic nuclear stress testing to further risk stratify. Discussed extensively implications of stress testing and possible need for angiography if results show high risk features. Overall results would provide guidance re: whether patient should undergo less invasive gallbladder surgery (ie cholecystostomy or laser therapy)\par (2) Carotid artery disease s/p L CEA, +L bruit\par (3) DM2\par (4) HTN\par (5) HLD\par (6) Multiple myeloma with anemia and CKD3\par \par Plan:\par - Pharn nuc stress\par - TTE\par - Carotid doppler\par - Obtain labs drawn today from OSH (?[facility])\par \par RTC after the above workup.\par \par If nuclear stress is negative he may proceed with surgery as a low-intermediate risk candidate based on age and comorbidities. If nuclear stress testing is positive (described as intermediate or high risk) would discuss whether he should undergo LHC prior to surgery. If surgery is urgent, he may proceed as a presumed intermediate risk patient.

## 2023-08-07 ENCOUNTER — LAB (OUTPATIENT)
Dept: LAB | Facility: HOSPITAL | Age: 23
End: 2023-08-07
Payer: COMMERCIAL

## 2023-08-07 ENCOUNTER — OFFICE VISIT (OUTPATIENT)
Dept: OBSTETRICS AND GYNECOLOGY | Facility: CLINIC | Age: 23
End: 2023-08-07
Payer: COMMERCIAL

## 2023-08-07 VITALS
WEIGHT: 249 LBS | SYSTOLIC BLOOD PRESSURE: 122 MMHG | DIASTOLIC BLOOD PRESSURE: 80 MMHG | HEART RATE: 112 BPM | BODY MASS INDEX: 47.05 KG/M2

## 2023-08-07 DIAGNOSIS — Z76.89 ENCOUNTER TO ESTABLISH CARE: ICD-10-CM

## 2023-08-07 DIAGNOSIS — N92.6 IRREGULAR MENSTRUAL BLEEDING: Primary | ICD-10-CM

## 2023-08-07 PROBLEM — O99.210 OBESITY AFFECTING PREGNANCY, ANTEPARTUM: Status: RESOLVED | Noted: 2021-11-05 | Resolved: 2023-08-07

## 2023-08-07 PROBLEM — O34.219 PREVIOUS CESAREAN DELIVERY AFFECTING PREGNANCY: Status: RESOLVED | Noted: 2022-02-10 | Resolved: 2023-08-07

## 2023-08-07 PROBLEM — O34.219 PREVIOUS CESAREAN DELIVERY, ANTEPARTUM: Status: RESOLVED | Noted: 2021-11-03 | Resolved: 2023-08-07

## 2023-08-07 PROBLEM — Z34.80 SUPERVISION OF OTHER NORMAL PREGNANCY, ANTEPARTUM: Status: RESOLVED | Noted: 2021-09-30 | Resolved: 2023-08-07

## 2023-08-07 LAB
ALBUMIN SERPL-MCNC: 4.2 G/DL (ref 3.5–5.2)
ALBUMIN/GLOB SERPL: 1.3 G/DL
ALP SERPL-CCNC: 81 U/L (ref 39–117)
ALT SERPL W P-5'-P-CCNC: 10 U/L (ref 1–33)
ANION GAP SERPL CALCULATED.3IONS-SCNC: 14.2 MMOL/L (ref 5–15)
AST SERPL-CCNC: 19 U/L (ref 1–32)
BILIRUB SERPL-MCNC: <0.2 MG/DL (ref 0–1.2)
BUN SERPL-MCNC: 9 MG/DL (ref 6–20)
BUN/CREAT SERPL: 11.1 (ref 7–25)
CALCIUM SPEC-SCNC: 9 MG/DL (ref 8.6–10.5)
CHLORIDE SERPL-SCNC: 104 MMOL/L (ref 98–107)
CO2 SERPL-SCNC: 19.8 MMOL/L (ref 22–29)
CREAT SERPL-MCNC: 0.81 MG/DL (ref 0.57–1)
DEPRECATED RDW RBC AUTO: 37.9 FL (ref 37–54)
EGFRCR SERPLBLD CKD-EPI 2021: 104.8 ML/MIN/1.73
ERYTHROCYTE [DISTWIDTH] IN BLOOD BY AUTOMATED COUNT: 14.2 % (ref 12.3–15.4)
FSH SERPL-ACNC: 1.51 MIU/ML
GLOBULIN UR ELPH-MCNC: 3.2 GM/DL
GLUCOSE SERPL-MCNC: 125 MG/DL (ref 65–99)
HBA1C MFR BLD: 6.1 % (ref 4.8–5.6)
HCT VFR BLD AUTO: 38.4 % (ref 34–46.6)
HGB BLD-MCNC: 12.8 G/DL (ref 12–15.9)
MCH RBC QN AUTO: 25.1 PG (ref 26.6–33)
MCHC RBC AUTO-ENTMCNC: 33.3 G/DL (ref 31.5–35.7)
MCV RBC AUTO: 75.4 FL (ref 79–97)
PLATELET # BLD AUTO: 335 10*3/MM3 (ref 140–450)
PMV BLD AUTO: 10.7 FL (ref 6–12)
POTASSIUM SERPL-SCNC: 4.1 MMOL/L (ref 3.5–5.2)
PROT SERPL-MCNC: 7.4 G/DL (ref 6–8.5)
RBC # BLD AUTO: 5.09 10*6/MM3 (ref 3.77–5.28)
SODIUM SERPL-SCNC: 138 MMOL/L (ref 136–145)
T4 FREE SERPL-MCNC: 1.27 NG/DL (ref 0.93–1.7)
TSH SERPL DL<=0.05 MIU/L-ACNC: 1.45 UIU/ML (ref 0.27–4.2)
WBC NRBC COR # BLD: 14.27 10*3/MM3 (ref 3.4–10.8)

## 2023-08-07 PROCEDURE — 84270 ASSAY OF SEX HORMONE GLOBUL: CPT | Performed by: NURSE PRACTITIONER

## 2023-08-07 PROCEDURE — 85027 COMPLETE CBC AUTOMATED: CPT | Performed by: NURSE PRACTITIONER

## 2023-08-07 PROCEDURE — 84439 ASSAY OF FREE THYROXINE: CPT | Performed by: NURSE PRACTITIONER

## 2023-08-07 PROCEDURE — 83036 HEMOGLOBIN GLYCOSYLATED A1C: CPT | Performed by: NURSE PRACTITIONER

## 2023-08-07 PROCEDURE — 36415 COLL VENOUS BLD VENIPUNCTURE: CPT | Performed by: NURSE PRACTITIONER

## 2023-08-07 PROCEDURE — 83001 ASSAY OF GONADOTROPIN (FSH): CPT | Performed by: NURSE PRACTITIONER

## 2023-08-07 PROCEDURE — 80053 COMPREHEN METABOLIC PANEL: CPT | Performed by: NURSE PRACTITIONER

## 2023-08-07 PROCEDURE — 84443 ASSAY THYROID STIM HORMONE: CPT | Performed by: NURSE PRACTITIONER

## 2023-08-07 NOTE — PROGRESS NOTES
GYN Visit    CC:   Chief Complaint   Patient presents with    Menstrual Problem     Abnormal bleeding with cycles        HPI:   23 y.o. Contraception or HRT: Contraception:  Condoms    Last month her cycle lasted two weeks, medium flow  Lasted two weeks this month as well, first week light brown flow, second week flow became red but was still light.    Prior to this her cycle was regular, 7 days, medium flow.  Usually has cramping but not the past two months.     Condoms for birth control.  Denies any recent illness  Does not have PCP, would like referral to establish care    History: PMHx, Meds, Allergies, PSHx, Social Hx, and POBHx all reviewed and updated.    Review of Systems   Constitutional: Negative.    Genitourinary:  Positive for menstrual problem.     PHYSICAL EXAM:  /80   Pulse 112   Wt 113 kg (249 lb)   BMI 47.05 kg/mý      Physical Exam  Vitals and nursing note reviewed.   Constitutional:       Appearance: Normal appearance. She is well-developed and well-groomed.   Neurological:      Mental Status: She is alert.   Psychiatric:         Attention and Perception: Attention and perception normal.         Mood and Affect: Affect normal.         Speech: Speech normal.         Behavior: Behavior is cooperative.         Cognition and Memory: Cognition normal.       ASSESSMENT AND PLAN:  Diagnoses and all orders for this visit:    1. Irregular menstrual bleeding (Primary)  Overview:  Will check labs and ultrasound.  If normal, will plan pelvic exam to check for infection.    Orders:  -     Cancel: Hemoglobin A1c  -     Cancel: Comprehensive Metabolic Panel  -     Cancel: CBC (No Diff)  -     Cancel: Follicle Stimulating Hormone  -     Cancel: T4, Free  -     Cancel: TSH  -     Cancel: Sex Horm Binding Globulin  -     US Non-ob Transvaginal; Future  -     CBC (No Diff)  -     Comprehensive Metabolic Panel  -     Follicle Stimulating Hormone  -     Hemoglobin A1c  -     Sex Horm Binding  Globulin  -     T4, Free  -     TSH    2. Encounter to establish care  -     Ambulatory Referral to Family Practice        Counseling:  Will plan follow up after ultrasound    Follow Up:  Return for Follow up after ultrasound.        Owen Manuel, APRN  08/07/2023    [facility] [address]  [facility]  [address] [address]  Dept: [phone]  Loc: [phone]

## 2023-08-08 ENCOUNTER — TELEPHONE (OUTPATIENT)
Dept: OBSTETRICS AND GYNECOLOGY | Facility: CLINIC | Age: 23
End: 2023-08-08
Payer: COMMERCIAL

## 2023-08-08 LAB — SHBG SERPL-SCNC: 37.7 NMOL/L (ref 24.6–122)

## 2023-08-08 NOTE — TELEPHONE ENCOUNTER
----- Message from ISAIAH Choudhury sent at 8/8/2023 10:52 AM EDT -----  Please let patient know her HBG A1C is slightly elevated at 6.1, indicating increased risk for diabetes.  Otherwise her labs are normal. Recommend follow up with PCP when established.  Will await ultrasound.

## 2023-08-08 NOTE — TELEPHONE ENCOUNTER
Discussed with patient that her HBG A1C is slightly elevated at 6.1, indicating increased risk for diabetes. Otherwise her labs are normal. Recommend follow up with PCP when established. Informed patient that we will await ultrasound.

## 2023-08-08 NOTE — TELEPHONE ENCOUNTER
Caller: Meliton Pepe    Relationship: Self    Best call back number: [phone]    What is the best time to reach you: ANYTIME     Who are you requesting to speak with (clinical staff, provider,  specific staff member): CLINICAL    Do you know the name of the person who called: [place]    What was the call regarding: TEST RESULTS       UNABLE TO WT CALL

## 2023-11-09 ENCOUNTER — TELEPHONE (OUTPATIENT)
Dept: OBSTETRICS AND GYNECOLOGY | Facility: CLINIC | Age: 23
End: 2023-11-09

## 2023-12-06 PROCEDURE — 82948 REAGENT STRIP/BLOOD GLUCOSE: CPT

## 2025-01-27 ENCOUNTER — TELEPHONE (OUTPATIENT)
Dept: OBSTETRICS AND GYNECOLOGY | Facility: CLINIC | Age: 25
End: 2025-01-27

## 2025-01-27 DIAGNOSIS — O20.0 THREATENED ABORTION: Primary | ICD-10-CM

## 2025-01-27 NOTE — TELEPHONE ENCOUNTER
Provider: DR JOHNSON    Caller: Meliton Pepe    Relationship to Patient: Self    Phone Number: [phone]    Reason for Call: NEW OB PT CALLED STATED SHE TOOK A PREGNANCY TEST ABOUT A WEEK AGO; PT LMP IS 11/24; PT STATED ABOUT 1 WEEK AGO SHE STARTED HAVING ABDOMINAL DISCOMFORT, FEELS LIKE CRAMPS OR GAS PAIN; PT STATED IT ONLY HAPPENS AT NIGHT WHILE LAYING DOWN;    PT IS WANTING TO COME IN FOR AN U/S IF POSSIBLE.    PLEASE CALL PT TO ADVISE AND SCHEDULE NEW OB APPT.

## 2025-02-14 ENCOUNTER — CLINICAL SUPPORT (OUTPATIENT)
Dept: OBSTETRICS AND GYNECOLOGY | Facility: CLINIC | Age: 25
End: 2025-02-14
Payer: COMMERCIAL

## 2025-02-14 DIAGNOSIS — O20.0 THREATENED ABORTION: ICD-10-CM

## 2025-02-14 LAB — HCG INTACT+B SERPL-ACNC: NORMAL MIU/ML

## 2025-02-14 PROCEDURE — 84702 CHORIONIC GONADOTROPIN TEST: CPT | Performed by: OBSTETRICS & GYNECOLOGY

## 2025-02-15 DIAGNOSIS — O20.0 THREATENED ABORTION: Primary | ICD-10-CM

## 2025-02-18 ENCOUNTER — OFFICE VISIT (OUTPATIENT)
Dept: OBSTETRICS AND GYNECOLOGY | Facility: CLINIC | Age: 25
End: 2025-02-18
Payer: COMMERCIAL

## 2025-02-18 VITALS
HEART RATE: 96 BPM | BODY MASS INDEX: 37.95 KG/M2 | SYSTOLIC BLOOD PRESSURE: 116 MMHG | WEIGHT: 201 LBS | HEIGHT: 61 IN | DIASTOLIC BLOOD PRESSURE: 76 MMHG

## 2025-02-18 DIAGNOSIS — Z32.00 VISIT FOR CONFIRMATION OF PREGNANCY TEST RESULT WITH PHYSICAL EXAM: Primary | ICD-10-CM

## 2025-02-18 PROBLEM — N92.6 IRREGULAR MENSTRUAL BLEEDING: Status: RESOLVED | Noted: 2023-08-07 | Resolved: 2025-02-18

## 2025-02-18 RX ORDER — PRENATAL VIT NO.126/IRON/FOLIC 28MG-0.8MG
TABLET ORAL DAILY
COMMUNITY

## 2025-02-18 NOTE — ASSESSMENT & PLAN NOTE
Today's ultrasound was reviewed.  The patient's EDC was changed to 9/9/2025 due to discrepancy from her last menstrual period of 12 days.  Continue daily prenatal vitamin  Schedule new OB visit

## 2025-02-18 NOTE — PROGRESS NOTES
"[facility]  Gynecological Visit    CC: Follow-up ultrasound    Subjective:   24 y.o. who presents in follow-up with a pelvic ultrasound in regards to pregnancy viability.  The patient denies any vaginal bleeding or pelvic pain.  She reports some nausea mostly in the morning.  She denies any significant vomiting.    History:   Past medical history, medications, allergies, surgical history, social history, and obstetrical history all reviewed and updated.    Last Completed Pap Smear       This patient has no relevant Health Maintenance data.          Objective:/76   Pulse 96   Ht 154.9 cm (61\")   Wt 91.2 kg (201 lb)   LMP 2024   Breastfeeding No   BMI 37.98 kg/m²     Physical Exam  Vitals and nursing note reviewed.   Constitutional:       General: She is not in acute distress.     Appearance: Normal appearance. She is not ill-appearing.   Neurological:      Mental Status: She is alert and oriented to person, place, and time.   Psychiatric:         Mood and Affect: Mood normal.         Behavior: Behavior normal.         Thought Content: Thought content normal.         Judgment: Judgment normal.       Assessment and Plan:  Diagnoses and all orders for this visit:    1. Visit for confirmation of pregnancy test result with physical exam (Primary)  Assessment & Plan:  Today's ultrasound was reviewed.  The patient's EDC was changed to 2025 due to discrepancy from her last menstrual period of 12 days.  Continue daily prenatal vitamin  Schedule new OB visit          Follow Up:  Return in about 4 weeks (around 3/18/2025) for Recheck.    Janak Gross MD  2025      "

## 2025-02-26 NOTE — PROGRESS NOTES
Venipuncture Blood Specimen Collection  Venipuncture performed in left arm by Celeste Mason with good hemostasis. Patient tolerated the procedure well without complications.   02/26/25   Celeste Mason

## 2025-03-24 ENCOUNTER — TELEPHONE (OUTPATIENT)
Dept: OBSTETRICS AND GYNECOLOGY | Facility: CLINIC | Age: 25
End: 2025-03-24

## 2025-03-24 NOTE — TELEPHONE ENCOUNTER
Caller: Meliton Pepe    Relationship:  Self    Best call back number: [phone]    PATIENT CALLED REQUESTING TO CANCEL SAME DAY APPT.    Did the patient call AFTER the start time of their scheduled appointment?  []YES  [x]NO    Was the patient's appointment rescheduled? []YES  [x]NO    Any additional information: PLEASE ASSIST WITH R/S, HUB UNABLE TO WT.

## 2025-03-31 ENCOUNTER — INITIAL PRENATAL (OUTPATIENT)
Dept: OBSTETRICS AND GYNECOLOGY | Age: 25
End: 2025-03-31
Payer: COMMERCIAL

## 2025-03-31 VITALS
DIASTOLIC BLOOD PRESSURE: 76 MMHG | WEIGHT: 225 LBS | SYSTOLIC BLOOD PRESSURE: 116 MMHG | HEIGHT: 62 IN | BODY MASS INDEX: 41.41 KG/M2

## 2025-03-31 DIAGNOSIS — Z34.80 SUPERVISION OF OTHER NORMAL PREGNANCY, ANTEPARTUM: Primary | ICD-10-CM

## 2025-03-31 LAB
ABO GROUP BLD: NORMAL
AMPHET+METHAMPHET UR QL: NEGATIVE
AMPHETAMINES UR QL: NEGATIVE
BARBITURATES UR QL SCN: NEGATIVE
BASOPHILS # BLD AUTO: 0.06 10*3/MM3 (ref 0–0.2)
BASOPHILS NFR BLD AUTO: 0.4 % (ref 0–1.5)
BENZODIAZ UR QL SCN: NEGATIVE
BLD GP AB SCN SERPL QL: NEGATIVE
BUPRENORPHINE SERPL-MCNC: NEGATIVE NG/ML
C TRACH RRNA CVX QL NAA+PROBE: NOT DETECTED
CANNABINOIDS SERPL QL: POSITIVE
COCAINE UR QL: NEGATIVE
DEPRECATED RDW RBC AUTO: 36.2 FL (ref 37–54)
EOSINOPHIL # BLD AUTO: 0 10*3/MM3 (ref 0–0.4)
EOSINOPHIL NFR BLD AUTO: 0 % (ref 0.3–6.2)
ERYTHROCYTE [DISTWIDTH] IN BLOOD BY AUTOMATED COUNT: 12.9 % (ref 12.3–15.4)
FENTANYL UR-MCNC: NEGATIVE NG/ML
GLUCOSE UR STRIP-MCNC: NEGATIVE MG/DL
HBV SURFACE AG SERPL QL IA: NORMAL
HCT VFR BLD AUTO: 34.3 % (ref 34–46.6)
HCV AB SER QL: NORMAL
HGB BLD-MCNC: 11.4 G/DL (ref 12–15.9)
HIV 1+2 AB+HIV1 P24 AG SERPL QL IA: NORMAL
IMM GRANULOCYTES # BLD AUTO: 0.09 10*3/MM3 (ref 0–0.05)
IMM GRANULOCYTES NFR BLD AUTO: 0.6 % (ref 0–0.5)
LYMPHOCYTES # BLD AUTO: 2.83 10*3/MM3 (ref 0.7–3.1)
LYMPHOCYTES NFR BLD AUTO: 17.7 % (ref 19.6–45.3)
MCH RBC QN AUTO: 26.3 PG (ref 26.6–33)
MCHC RBC AUTO-ENTMCNC: 33.2 G/DL (ref 31.5–35.7)
MCV RBC AUTO: 79.2 FL (ref 79–97)
METHADONE UR QL SCN: NEGATIVE
MONOCYTES # BLD AUTO: 1.07 10*3/MM3 (ref 0.1–0.9)
MONOCYTES NFR BLD AUTO: 6.7 % (ref 5–12)
N GONORRHOEA RRNA SPEC QL NAA+PROBE: NOT DETECTED
NEUTROPHILS NFR BLD AUTO: 11.96 10*3/MM3 (ref 1.7–7)
NEUTROPHILS NFR BLD AUTO: 74.6 % (ref 42.7–76)
NRBC BLD AUTO-RTO: 0 /100 WBC (ref 0–0.2)
OPIATES UR QL: NEGATIVE
OXYCODONE UR QL SCN: NEGATIVE
PCP UR QL SCN: NEGATIVE
PLATELET # BLD AUTO: 295 10*3/MM3 (ref 140–450)
PMV BLD AUTO: 11 FL (ref 6–12)
PROT UR STRIP-MCNC: NEGATIVE MG/DL
RBC # BLD AUTO: 4.33 10*6/MM3 (ref 3.77–5.28)
RH BLD: POSITIVE
TRICYCLICS UR QL SCN: NEGATIVE
WBC NRBC COR # BLD AUTO: 16.01 10*3/MM3 (ref 3.4–10.8)

## 2025-03-31 PROCEDURE — 86803 HEPATITIS C AB TEST: CPT | Performed by: OBSTETRICS & GYNECOLOGY

## 2025-03-31 PROCEDURE — 87591 N.GONORRHOEAE DNA AMP PROB: CPT | Performed by: OBSTETRICS & GYNECOLOGY

## 2025-03-31 PROCEDURE — 80307 DRUG TEST PRSMV CHEM ANLYZR: CPT | Performed by: OBSTETRICS & GYNECOLOGY

## 2025-03-31 PROCEDURE — 80081 OBSTETRIC PANEL INC HIV TSTG: CPT | Performed by: OBSTETRICS & GYNECOLOGY

## 2025-03-31 PROCEDURE — G0123 SCREEN CERV/VAG THIN LAYER: HCPCS | Performed by: OBSTETRICS & GYNECOLOGY

## 2025-03-31 PROCEDURE — 83020 HEMOGLOBIN ELECTROPHORESIS: CPT | Performed by: OBSTETRICS & GYNECOLOGY

## 2025-03-31 PROCEDURE — 87491 CHLMYD TRACH DNA AMP PROBE: CPT | Performed by: OBSTETRICS & GYNECOLOGY

## 2025-03-31 PROCEDURE — 87086 URINE CULTURE/COLONY COUNT: CPT | Performed by: OBSTETRICS & GYNECOLOGY

## 2025-03-31 PROCEDURE — 99213 OFFICE O/P EST LOW 20 MIN: CPT | Performed by: OBSTETRICS & GYNECOLOGY

## 2025-03-31 NOTE — PROGRESS NOTES
"OB Initial Visit    CC- Here for care of current pregnancy, first visit  Chief Complaint   Patient presents with    Initial Prenatal Visit     Pelvic pain     US Ob < 14 Weeks Single or First Gestation (02/18/2025 13:06)    IGP,CtNgTv,rfx Aptima HPV ASCU (09/17/2021 11:41)    hCG, Quantitative, Pregnancy (02/14/2025 10:56)    Progress Notes by Janak Gross MD (02/18/2025 13:20)    Subjective:  24 y.o. presenting for her first obstetrical visit.    LMP: Patient's last menstrual period was 11/21/2024.           Reviewed and updated:  OBHx, GYNHx (STDs), PMHx, Medications, Allergies, PSHx, Social Hx, Preventative Hx (PAP), Vaccine Hx, Genetic Hx (pt, FOB, both families).        Objective:  /76   Ht 157.5 cm (62\")   Wt 102 kg (225 lb)   LMP 11/21/2024   BMI 41.15 kg/m²    Chaperone present during breast and/or pelvic exam if performed.  Abdomen- Gravid 17 week size , FHT's heard @ 154    External genitalia- Normal, no lesions  Urethra- Normal, no masses, non tender  Vagina- Normal, no discharge  Bladder- Normal, no masses, non tender  Cervix- Normal, no lesions, no discharge, no CMT  Uterus- Consistent with dates,       Adnexa- Normal, no mass, non tender    Lymphatic- No palpable neck, axillary, or groin nodes  Extremities- No edema, no cyanosis    Skin- No lesions, no rashes    Assessment and Plan:  16w6d   Diagnoses and all orders for this visit:    1. Supervision of other normal pregnancy, antepartum (Primary)  -     POC Urinalysis Dipstick  -     Urine Culture - Urine, Urine, Clean Catch  -     Urine Drug Screen - Urine, Clean Catch; Future  -     Chlamydia trachomatis, Neisseria gonorrhoeae, PCR - Swab, Cervix; Future  -     IGP,rfx Aptima HPV All Pth; Future  -     Hemoglobinopathy Fractionation [place]; Future  -     OB Panel With HIV  -     US Ob Detail Fetal Anatomy Single or First Gestation; Future        Counseling:   Questions answered      Return in about 4 weeks (around " 4/28/2025).            Lewis Moreno Sr, MD  03/31/2025    [facility]  [address] [place] [address]  Dept: [phone]  Dept Fax: [phone]  Loc: [phone]  Loc Fax: [phone]

## 2025-04-01 LAB — RPR SER QL: NORMAL

## 2025-04-02 LAB
BACTERIA SPEC AEROBE CULT: NO GROWTH
CONV .: NORMAL
CYTOLOGIST CVX/VAG CYTO: NORMAL
CYTOLOGY CVX/VAG DOC CYTO: NORMAL
CYTOLOGY CVX/VAG DOC THIN PREP: NORMAL
DX ICD CODE: NORMAL
HGB A MFR BLD ELPH: 97.6 % (ref 96.4–98.8)
HGB A2 MFR BLD ELPH: 2.4 % (ref 1.8–3.2)
HGB F MFR BLD ELPH: 0 % (ref 0–2)
HGB FRACT BLD-IMP: NORMAL
HGB S MFR BLD ELPH: 0 %
OTHER STN SPEC: NORMAL
RUBV IGG SERPL IA-ACNC: 10.5 INDEX
SERVICE CMNT-IMP: NORMAL
STAT OF ADQ CVX/VAG CYTO-IMP: NORMAL

## 2025-04-09 ENCOUNTER — TELEPHONE (OUTPATIENT)
Dept: OBSTETRICS AND GYNECOLOGY | Age: 25
End: 2025-04-09
Payer: COMMERCIAL

## 2025-04-09 NOTE — TELEPHONE ENCOUNTER
Appointment was moved to 05/01/2025 with US @ 2:30 and f/u with ISAIAH Choudhury @ 3:30. Patient informed.

## 2025-04-09 NOTE — TELEPHONE ENCOUNTER
Attempted to contact patient, no answer, left VM. Was going to move 04/30/2025 appt to Dr. Gross's schedule @ 2:40.

## 2025-04-28 NOTE — PROGRESS NOTES
OB FOLLOW UP        Chief Complaint   Patient presents with    Routine Prenatal Visit       Subjective:   No complaints.  Denies VB, leaking fluid, current regular cramping or contractions.    Objective:  /72   Wt 103 kg (226 lb)   LMP 2024   BMI 41.34 kg/m²   Uterine Size: not examined, US today  FHT: 110-160 BPM    See OB flow for LE edema, cvx exam if performed, and Upro/Uglu    Assessment and Plan:  20w6d  Reassuring pregnancy progress.  Questions answered.  Diagnoses and all orders for this visit:    1. Supervision of other normal pregnancy, antepartum (Primary)  Overview:  PINEDA finalized: 25    Optional testing NIPS,CF/SMA,AFP:  declines    COVID vaccine: declines  Flu vaccine:  Tdap vaccine:    Rhogam:  1hr Glucola:  FU RPR w glucola:  ? Desires Sterilization:    Anatomy US: 25 normal anatomy except limited cardiac views, anterior placenta, EFW 34%, AC 37%.  Follow up ordered  FU US:    PROBLEM LIST/PLAN:   Previous C/S - planning repeat    Maternal obesity - plan NSTs at 32-36 weeks        Orders:  -     POC Urinalysis Dipstick  -     US Ob 14 + Weeks Single or First Gestation; Future    2. Previous  section  Overview:  Previous C/S times 2, planning repeat for delivery      3. Obesity affecting pregnancy, antepartum, unspecified obesity type  Overview:  Plan NSTs at 32-36 weeks        Counseling:    Second trimester precautions  Continue PNV.  Importance of healthy eating and exercise.    Return for OB follow up, Dr. Gross, F/U US, 1hGTT.        Owen Manuel, APRN  2025    [facility] [address] [facility]  [address] [place] [address] [address] [place] [address]  Dept: [phone]  Dept Fax: [phone]  Loc: [phone]

## 2025-05-01 ENCOUNTER — ROUTINE PRENATAL (OUTPATIENT)
Dept: OBSTETRICS AND GYNECOLOGY | Age: 25
End: 2025-05-01
Payer: COMMERCIAL

## 2025-05-01 VITALS — BODY MASS INDEX: 41.34 KG/M2 | WEIGHT: 226 LBS | SYSTOLIC BLOOD PRESSURE: 118 MMHG | DIASTOLIC BLOOD PRESSURE: 72 MMHG

## 2025-05-01 DIAGNOSIS — O99.210 OBESITY AFFECTING PREGNANCY, ANTEPARTUM, UNSPECIFIED OBESITY TYPE: ICD-10-CM

## 2025-05-01 DIAGNOSIS — Z98.891 PREVIOUS CESAREAN SECTION: ICD-10-CM

## 2025-05-01 DIAGNOSIS — Z34.80 SUPERVISION OF OTHER NORMAL PREGNANCY, ANTEPARTUM: Primary | ICD-10-CM

## 2025-05-01 PROBLEM — Z32.00 VISIT FOR CONFIRMATION OF PREGNANCY TEST RESULT WITH PHYSICAL EXAM: Status: RESOLVED | Noted: 2025-02-18 | Resolved: 2025-05-01

## 2025-05-01 LAB
GLUCOSE UR STRIP-MCNC: NEGATIVE MG/DL
PROT UR STRIP-MCNC: NEGATIVE MG/DL

## 2025-05-01 NOTE — ASSESSMENT & PLAN NOTE
Doing well  Reviewed anatomy US, discussed need for follow up  Follow up US ordered  1hGTT next visit

## 2025-05-05 VITALS
SYSTOLIC BLOOD PRESSURE: 123 MMHG | DIASTOLIC BLOOD PRESSURE: 76 MMHG | HEIGHT: 62 IN | WEIGHT: 230.16 LBS | BODY MASS INDEX: 42.35 KG/M2 | TEMPERATURE: 98.4 F | OXYGEN SATURATION: 100 % | RESPIRATION RATE: 16 BRPM | HEART RATE: 88 BPM

## 2025-05-05 PROCEDURE — 99282 EMERGENCY DEPT VISIT SF MDM: CPT

## 2025-05-06 ENCOUNTER — HOSPITAL ENCOUNTER (EMERGENCY)
Facility: HOSPITAL | Age: 25
Discharge: HOME | End: 2025-05-06
Attending: EMERGENCY MEDICINE | Admitting: EMERGENCY MEDICINE
Payer: COMMERCIAL

## 2025-05-06 DIAGNOSIS — K08.89 PAIN, DENTAL: Primary | ICD-10-CM

## 2025-05-06 RX ORDER — CLINDAMYCIN HYDROCHLORIDE 150 MG/1
450 CAPSULE ORAL 3 TIMES DAILY
Qty: 63 CAPSULE | Refills: 0 | Status: SHIPPED | OUTPATIENT
Start: 2025-05-06 | End: 2025-05-13

## 2025-05-06 NOTE — DISCHARGE INSTRUCTIONS
You are being discharged home with clindamycin.  Take this medication as prescribed.  You can also take Tylenol 1000 mg 2-3 times a day for pain.  Use Sensodyne toothpaste as well.    You can call the [facility] oral health clinic or [facility] for a dental appointment.    Follow up with your Primary Care Provider in 3-5 days especially if symptoms worsen.    Return to the Emergency Department if you develop any uncontrollable fever, intractable pain, nausea, vomiting.

## 2025-05-06 NOTE — ED PROVIDER NOTES
Time: 1:24 AM EDT  Date of encounter:  2025  Independent Historian/Clinical History and Information was obtained by:   Patient    History is limited by: N/A    Chief Complaint: Dental pain      History of Present Illness:  Patient is a 24 y.o. year old female who presents to the emergency department for evaluation of right lower dental pain.  Patient states that this has been bothering her for about 4 days.  States that she has been taking Tylenol every day.  States that this is not touching her pain.  She has also used Orajel at home.  She is 22 weeks gestation.  Denies any fever.      Patient Care Team  Primary Care Provider: Provider, Karen Known    Past Medical History:     No Known Allergies  Past Medical History:   Diagnosis Date    Abnormal glucose complicating pregnancy     DELIVERY;PUERPERIUM    Acute vaginitis     Chlamydia     Chlamydial infection, unspecified     Gonococcal infection, unspecified     Vitamin D deficiency, unspecified      Past Surgical History:   Procedure Laterality Date     SECTION       SECTION N/A 2/10/2022    Procedure:  SECTION REPEAT;  Surgeon: Janak Gross MD;  Location: [facility] LABOR DELIVERY;  Service: Gynecology;  Laterality: N/A;     Family History   Problem Relation Age of Onset    Breast cancer Paternal Grandmother     Ovarian cancer Paternal Aunt        Home Medications:  Prior to Admission medications    Medication Sig Start Date End Date Taking? Authorizing Provider   prenatal vitamin (prenatal, CLASSIC, vitamin) tablet Take  by mouth Daily.    Provider, MD Bita        Social History:   Social History     Tobacco Use    Smoking status: Never    Smokeless tobacco: Never   Vaping Use    Vaping status: Never Used   Substance Use Topics    Alcohol use: Not Currently    Drug use: Never         Review of Systems:  Review of Systems   Constitutional:  Negative for chills and fever.   HENT:  Positive for dental problem. Negative for  "ear pain.    Eyes:  Negative for pain.   Respiratory:  Negative for cough and shortness of breath.    Cardiovascular:  Negative for chest pain.   Gastrointestinal:  Negative for abdominal pain, diarrhea, nausea and vomiting.   Genitourinary:  Negative for dysuria.   Musculoskeletal:  Negative for arthralgias.   Skin:  Negative for rash.   Neurological:  Negative for headaches.        Physical Exam:  /76 (BP Location: Left arm, Patient Position: Sitting)   Pulse 88   Temp 98.4 °F (36.9 °C) (Oral)   Resp 16   Ht 157.5 cm (62\")   Wt 104 kg (230 lb 2.6 oz)   LMP 11/21/2024   SpO2 100%   BMI 42.10 kg/m²     Physical Exam  HENT:      Head: Normocephalic.      Mouth/Throat:      Mouth: Mucous membranes are moist.      Comments: Poor dentition.  [place] tooth is still present but chipped. Unable to fully visualize if there is a dental abscess present due to swellling.  Eyes:      Pupils: Pupils are equal, round, and reactive to light.   Pulmonary:      Effort: Pulmonary effort is normal.   Abdominal:      General: There is no distension.   Musculoskeletal:      Cervical back: Neck supple.   Skin:     General: Skin is warm and dry.   Neurological:      General: No focal deficit present.      Mental Status: She is alert and oriented to person, place, and time.   Psychiatric:         Mood and Affect: Mood normal.         Behavior: Behavior normal.                    Medical Decision Making:      Comorbidities that affect care:    Pregnancy    External Notes reviewed:    None      The following orders were placed and all results were independently analyzed by me:  No orders of the defined types were placed in this encounter.      Medications Given in the Emergency Department:  Medications - No data to display     ED Course:         Labs:    Lab Results (last 24 hours)       ** No results found for the last 24 hours. **             Imaging:    No Radiology Exams Resulted Within Past 24 Hours      Differential " Diagnosis and Discussion:    Dental Pain: Differential diagnosis includes but is not limited to dental caries, periodontitis, pericoronitis, peridental abscess, gingival abscess, apthous stomatitis, allergic stomatitis, acute necrotizing ulcerative gingivitis, herpetic stomatitis.    PROCEDURES:        No orders to display       Procedures    MDM  Risk of Complications, Morbidity, and/or Mortality  Presenting problems: moderate  Diagnostic procedures: low  Management options: low    Patient Progress  Patient progress: stable                       Patient Care Considerations:    SEPSIS was considered but is NOT present in the emergency department as SIRS criteria is not present.      Consultants/Shared Management Plan:    None    Social Determinants of Health:    Patient is independent, reliable, and has access to care.       Disposition and Care Coordination:    Discharged: The patient is suitable and stable for discharge with no need for consideration of admission.    I have explained the patient´s condition, diagnoses and treatment plan based on the information available to me at this time. I have answered questions and addressed any concerns. The patient has a good  understanding of the patient´s diagnosis, condition, and treatment plan as can be expected at this point. The vital signs have been stable. The patient´s condition is stable and appropriate for discharge from the emergency department.      The patient will pursue further outpatient evaluation with the primary care physician or other designated or consulting physician as outlined in the discharge instructions. They are agreeable to this plan of care and follow-up instructions have been explained in detail. The patient has received these instructions in written format and has expressed an understanding of the discharge instructions. The patient is aware that any significant change in condition or worsening of symptoms should prompt an immediate return  to this or the closest emergency department or call to 911.  I have explained discharge medications and the need for follow up with the patient/caretakers. This was also printed in the discharge instructions. Patient was discharged with the following medications and follow up:      Medication List        New Prescriptions      clindamycin 150 MG capsule  Commonly known as: CLEOCIN  Take 3 capsules by mouth 3 (Three) Times a Day for 7 days.               Where to Get Your Medications        These medications were sent to [id number] - [place], [place] - [address] AT [facility] OF [address] & [place] - [phone]  - [phone]   [address], [place] [address]      Phone: [phone]   clindamycin 150 MG capsule      [facility]  [address] [place] [address] [phone]  Schedule an appointment as soon as possible for a visit       [facility]  [address] [place], [place] [id number] [phone]           Final diagnoses:   Pain, dental        ED Disposition       ED Disposition   Discharge    Condition   Stable    Comment   --               This medical record created using voice recognition software.             Edgar Rodgers PA  05/06/25 0129       Edgar Rodgers PA  05/06/25 0130

## 2025-05-14 ENCOUNTER — REFERRAL TRIAGE (OUTPATIENT)
Dept: LABOR AND DELIVERY | Facility: HOSPITAL | Age: 25
End: 2025-05-14
Payer: COMMERCIAL

## 2025-05-28 ENCOUNTER — ROUTINE PRENATAL (OUTPATIENT)
Dept: OBSTETRICS AND GYNECOLOGY | Age: 25
End: 2025-05-28
Payer: COMMERCIAL

## 2025-05-28 VITALS — SYSTOLIC BLOOD PRESSURE: 103 MMHG | BODY MASS INDEX: 41.34 KG/M2 | DIASTOLIC BLOOD PRESSURE: 70 MMHG | WEIGHT: 226 LBS

## 2025-05-28 DIAGNOSIS — Z34.80 SUPERVISION OF OTHER NORMAL PREGNANCY, ANTEPARTUM: Primary | ICD-10-CM

## 2025-05-28 DIAGNOSIS — O99.210 OBESITY AFFECTING PREGNANCY, ANTEPARTUM, UNSPECIFIED OBESITY TYPE: ICD-10-CM

## 2025-05-28 DIAGNOSIS — Z98.891 PREVIOUS CESAREAN SECTION: ICD-10-CM

## 2025-05-28 LAB
DEPRECATED RDW RBC AUTO: 39.3 FL (ref 37–54)
ERYTHROCYTE [DISTWIDTH] IN BLOOD BY AUTOMATED COUNT: 13.7 % (ref 12.3–15.4)
GLUCOSE 1H P GLC SERPL-MCNC: 146 MG/DL (ref 65–139)
GLUCOSE UR STRIP-MCNC: NEGATIVE MG/DL
HCT VFR BLD AUTO: 35.1 % (ref 34–46.6)
HGB BLD-MCNC: 11.5 G/DL (ref 12–15.9)
MCH RBC QN AUTO: 26.3 PG (ref 26.6–33)
MCHC RBC AUTO-ENTMCNC: 32.8 G/DL (ref 31.5–35.7)
MCV RBC AUTO: 80.1 FL (ref 79–97)
PLATELET # BLD AUTO: 271 10*3/MM3 (ref 140–450)
PMV BLD AUTO: 10.8 FL (ref 6–12)
PROT UR STRIP-MCNC: NEGATIVE MG/DL
RBC # BLD AUTO: 4.38 10*6/MM3 (ref 3.77–5.28)
WBC NRBC COR # BLD AUTO: 15.85 10*3/MM3 (ref 3.4–10.8)

## 2025-05-28 PROCEDURE — 82950 GLUCOSE TEST: CPT | Performed by: OBSTETRICS & GYNECOLOGY

## 2025-05-28 PROCEDURE — 85027 COMPLETE CBC AUTOMATED: CPT | Performed by: OBSTETRICS & GYNECOLOGY

## 2025-05-28 NOTE — PROGRESS NOTES
[facility]  OB Follow Up Visit    CC: Routine obstetrical visit    Prenatal care complicated by:  Patient Active Problem List   Diagnosis    Supervision of other normal pregnancy, antepartum    Previous  section    Obesity affecting pregnancy, antepartum     Subjective:   Meliton Pepe is a 24 y.o.  25w1d patient being seen today for her obstetrical follow up visit. The patient has: No complaints, No leaking fluid, No vaginal bleeding, No contractions, Adequate FM    History: Past medical and surgical history, medications, allergies, social history, and obstetrical history all reviewed and updated.    Objective:    Urine glucose/protein - See OB flow sheet      /70   Wt 103 kg (226 lb)   LMP 2024   BMI 41.34 kg/m²     General exam: Comfortable, NAD  FHR: 150 BPM   Uterine Size:  25 cm  Pelvic Exam: No    Assessment and Plan:  Diagnoses and all orders for this visit:    1. Supervision of other normal pregnancy, antepartum (Primary)  Overview:  PINEDA finalized: 25    Optional testing NIPS,CF/SMA,AFP:  declines    COVID vaccine: declines  Flu vaccine: Recommended  Tdap vaccine: Recommended    Anatomy US: 25 normal anatomy except limited cardiac views, anterior placenta, EFW 34%, AC 37%.          Assessment & Plan:  Today's follow-up anatomy ultrasound.  Appropriate growth is noted.  Anterior placenta.  The follow-up anatomy is normal.  YEE is normal.  1 hour GTT today  Continue prenatal vitamins  Fetal kick counts   labor warnings    Orders:  -     POC Urinalysis Dipstick  -     CBC (No Diff); Future  -     Gestational Diabetes Screen 1 Hour; Future    2. Previous  section  Overview:  Previous C/S times 2, planning repeat for delivery  Placenta: Anterior        3. Obesity affecting pregnancy, antepartum, unspecified obesity type  Overview:  Plan NSTs at 32-36 weeks        25w1d  Reassuring pregnancy progress    Counseling: OB precautions, leaking,  VB, curry bermeo vs PTL/Labor  FKC    Questions answered    Return in about 4 weeks (around 6/25/2025) for Recheck.    Janak Gross MD  05/28/2025

## 2025-05-28 NOTE — ASSESSMENT & PLAN NOTE
Today's follow-up anatomy ultrasound.  Appropriate growth is noted.  Anterior placenta.  The follow-up anatomy is normal.  YEE is normal.  1 hour GTT today  Continue prenatal vitamins  Fetal kick counts   labor warnings

## 2025-05-29 ENCOUNTER — RESULTS FOLLOW-UP (OUTPATIENT)
Dept: OBSTETRICS AND GYNECOLOGY | Age: 25
End: 2025-05-29

## 2025-05-29 DIAGNOSIS — Z34.80 SUPERVISION OF OTHER NORMAL PREGNANCY, ANTEPARTUM: Primary | ICD-10-CM

## 2025-06-03 NOTE — TELEPHONE ENCOUNTER
Patient called and informed of her results and recommendations. 3 hour GTT scheduled at [facility] lab 6/5 at 8 am. Instructions given and understood.

## 2025-06-05 ENCOUNTER — LAB (OUTPATIENT)
Facility: HOSPITAL | Age: 25
End: 2025-06-05
Payer: COMMERCIAL

## 2025-06-05 DIAGNOSIS — Z34.80 SUPERVISION OF OTHER NORMAL PREGNANCY, ANTEPARTUM: ICD-10-CM

## 2025-06-05 LAB
GLUCOSE BLDC GLUCOMTR-MCNC: 92 MG/DL (ref 70–99)
GLUCOSE P FAST SERPL-MCNC: 99 MG/DL (ref 65–94)
GTT GEST 2H PNL UR+SERPL: 180 MG/DL (ref 65–179)
GTT GEST 3H PNL SERPL: 140 MG/DL (ref 65–154)
GTT GEST 3H PNL SERPL: 94 MG/DL (ref 65–139)

## 2025-06-05 PROCEDURE — 82952 GTT-ADDED SAMPLES: CPT

## 2025-06-05 PROCEDURE — 36415 COLL VENOUS BLD VENIPUNCTURE: CPT

## 2025-06-05 PROCEDURE — 82951 GLUCOSE TOLERANCE TEST (GTT): CPT

## 2025-06-18 ENCOUNTER — TELEPHONE (OUTPATIENT)
Dept: OBSTETRICS AND GYNECOLOGY | Age: 25
End: 2025-06-18
Payer: COMMERCIAL

## 2025-06-18 NOTE — TELEPHONE ENCOUNTER
The [facility] received a fax that requires your attention. The document has been indexed to the patient’s chart for your review.      Reason for sending: NEEDS PROVIDER SIGNATURE    Documents Description: WRITTEN ORDER FOR BREAST PUMP AND/OR REPLACEMENT PARTS    Name of Sender: LARISSA     Date: 06/18/25

## 2025-07-01 ENCOUNTER — ROUTINE PRENATAL (OUTPATIENT)
Dept: OBSTETRICS AND GYNECOLOGY | Age: 25
End: 2025-07-01
Payer: COMMERCIAL

## 2025-07-01 ENCOUNTER — TELEPHONE (OUTPATIENT)
Dept: OBSTETRICS AND GYNECOLOGY | Age: 25
End: 2025-07-01
Payer: COMMERCIAL

## 2025-07-01 VITALS — DIASTOLIC BLOOD PRESSURE: 64 MMHG | BODY MASS INDEX: 41.7 KG/M2 | SYSTOLIC BLOOD PRESSURE: 93 MMHG | WEIGHT: 228 LBS

## 2025-07-01 DIAGNOSIS — O99.210 OBESITY AFFECTING PREGNANCY, ANTEPARTUM, UNSPECIFIED OBESITY TYPE: Primary | ICD-10-CM

## 2025-07-01 DIAGNOSIS — Z98.891 PREVIOUS CESAREAN SECTION: ICD-10-CM

## 2025-07-01 DIAGNOSIS — O24.410 GDM (GESTATIONAL DIABETES MELLITUS), CLASS A1: ICD-10-CM

## 2025-07-01 DIAGNOSIS — Z34.80 SUPERVISION OF OTHER NORMAL PREGNANCY, ANTEPARTUM: ICD-10-CM

## 2025-07-01 DIAGNOSIS — O26.849 FETAL SIZE INCONSISTENT WITH DATES: ICD-10-CM

## 2025-07-01 NOTE — TELEPHONE ENCOUNTER
"Per Secure chat from Dr. Gross: \"It looks like this patient I saw today actually failed her 3-hour GTT. When I looked back through her chart after her visit it appears as though I sent all of her testing supplies however I am not sure those were ever picked up. Would you please reach out to the patient and see if she has been checking her blood sugars. If she is not she needs to start right away as this can have significant impact on her baby if her gestational diabetes is uncontrolled and untreated.\"    Called patients number but number was not active. Called Ranjana's (mother) number and left a message to have the patient call the office.   "

## 2025-07-01 NOTE — TELEPHONE ENCOUNTER
Patient called back and discussed. She was given the number to schedule with the diabetic educator. She understands supplies were called in and will begin to monitor. She will being her log with her to her appointments for review.

## 2025-07-01 NOTE — PROGRESS NOTES
[facility]  OB Follow Up Visit    CC: Routine obstetrical visit    Prenatal care complicated by:  Patient Active Problem List   Diagnosis    Supervision of other normal pregnancy, antepartum    Previous  section    Obesity affecting pregnancy, antepartum    GDM (gestational diabetes mellitus), class A1     Subjective:   Meliton Pepe is a 25 y.o.  30w0d patient being seen today for her obstetrical follow up visit. The patient has: No complaints, No leaking fluid, No vaginal bleeding, No contractions, Adequate FM    History: Past medical and surgical history, medications, allergies, social history, and obstetrical history all reviewed and updated.    Objective:    Urine glucose/protein - See OB flow sheet      BP 93/64   Wt 103 kg (228 lb)   LMP 2024   BMI 41.70 kg/m²     General exam: Comfortable, NAD  FHR: 141 BPM   Uterine Size: 32 cm  Pelvic Exam: No    Assessment and Plan:  Diagnoses and all orders for this visit:    1. Obesity affecting pregnancy, antepartum, unspecified obesity type (Primary)  Overview:  Plan NSTs at 32-36 weeks    Assessment & Plan:  Previously counseled.      2. Previous  section  Overview:  Previous C/S times 2, planning repeat for delivery  Placenta: Anterior        3. Supervision of other normal pregnancy, antepartum  Overview:  PINEDA finalized: 25    Optional testing NIPS,CF/SMA,AFP:  declines    COVID vaccine: declines  Flu vaccine: Recommended  Tdap vaccine: Recommended    Anatomy US: 25 normal anatomy except limited cardiac views, anterior placenta, EFW 34%, AC 37%.          Assessment & Plan:  Continue prenatal vitamins  Fetal kick counts   labor warnings      4. Fetal size inconsistent with dates  -     US Ob 14 + Weeks Single or First Gestation; Future    5. GDM (gestational diabetes mellitus), class A1  Assessment & Plan:  The patient failed her 1 hour GTT and 3-hour GTT.  The patient has not started checking her  blood sugars.  We discussed the importance of blood sugar monitoring and the risks of uncontrolled blood sugars during pregnancy.  We discussed that this could include increased risk for macrosomia, NICU admission and even fetal demise.        30w0d  Reassuring pregnancy progress    Counseling: OB precautions, leaking, VB, curry bermeo vs PTL/Labor  [facility]    Questions answered    Return in about 2 weeks (around 7/15/2025) for Recheck.    Janak Gross MD  07/01/2025

## 2025-07-01 NOTE — ASSESSMENT & PLAN NOTE
The patient failed her 1 hour GTT and 3-hour GTT.  The patient has not started checking her blood sugars.  We discussed the importance of blood sugar monitoring and the risks of uncontrolled blood sugars during pregnancy.  We discussed that this could include increased risk for macrosomia, NICU admission and even fetal demise.

## 2025-07-18 ENCOUNTER — HOSPITAL ENCOUNTER (OUTPATIENT)
Facility: HOSPITAL | Age: 25
Setting detail: SURGERY ADMIT
Discharge: HOME | End: 2025-07-18
Attending: OBSTETRICS & GYNECOLOGY | Admitting: OBSTETRICS & GYNECOLOGY
Payer: COMMERCIAL

## 2025-07-18 VITALS
RESPIRATION RATE: 20 BRPM | WEIGHT: 228 LBS | HEART RATE: 102 BPM | BODY MASS INDEX: 41.96 KG/M2 | OXYGEN SATURATION: 99 % | TEMPERATURE: 98.7 F | DIASTOLIC BLOOD PRESSURE: 65 MMHG | SYSTOLIC BLOOD PRESSURE: 113 MMHG | HEIGHT: 62 IN

## 2025-07-18 PROBLEM — O26.893 PELVIC PAIN IN ANTEPARTUM PERIOD IN THIRD TRIMESTER: Status: ACTIVE | Noted: 2025-07-18

## 2025-07-18 PROBLEM — Z3A.32 32 WEEKS GESTATION OF PREGNANCY: Status: ACTIVE | Noted: 2025-07-18

## 2025-07-18 PROBLEM — R10.2 PELVIC PAIN IN ANTEPARTUM PERIOD IN THIRD TRIMESTER: Status: ACTIVE | Noted: 2025-07-18

## 2025-07-18 LAB
BILIRUB BLD-MCNC: ABNORMAL MG/DL
CLARITY, POC: CLEAR
COLOR UR: ABNORMAL
GLUCOSE UR STRIP-MCNC: NEGATIVE MG/DL
KETONES UR QL: ABNORMAL
LEUKOCYTE EST, POC: NEGATIVE
NITRITE UR-MCNC: NEGATIVE MG/ML
PH UR: 7 [PH] (ref 5–8)
PROT UR STRIP-MCNC: ABNORMAL MG/DL
RBC # UR STRIP: NEGATIVE /UL
SP GR UR: 1.02 (ref 1–1.03)
UROBILINOGEN UR QL: NORMAL

## 2025-07-18 PROCEDURE — G0463 HOSPITAL OUTPT CLINIC VISIT: HCPCS

## 2025-07-18 PROCEDURE — 59025 FETAL NON-STRESS TEST: CPT

## 2025-07-18 PROCEDURE — 81002 URINALYSIS NONAUTO W/O SCOPE: CPT | Performed by: OBSTETRICS & GYNECOLOGY

## 2025-07-18 NOTE — LETTER
July 18, 2025      [facility]  [address]  [place] [address]  [phone]          Patient: Meliton Pepe   YOB: 2000   Date of Visit: 7/18/2025       To Whom It May Concern:    Meliton Pepe was seen at [facility] on 7/18/2025.    Please excuse  from work .        Sincerely,       PradeepRN

## 2025-07-19 NOTE — NON STRESS TEST
"Obstetrical Non-stress Test Interpretation     Name:  Meliton Pepe  MRN: [id number]    25 y.o. female  at 32w3d    Indication: pelvic pain      Fetal Assessment  Fetal Movement: active  Fetal HR Assessment Method: external  Fetal HR (beats/min): 135  Fetal HR Baseline: normal range  Fetal HR Variability: moderate (amplitude range 6 to 25 bpm)  Fetal HR Accelerations: episodic, greater than/equal to 15 bpm, lasting at least 15 seconds  Fetal HR Decelerations: absent  Sinusoidal Pattern Present: absent    /65   Pulse 102   Temp 98.7 °F (37.1 °C) (Oral)   Resp 20   Ht 157.5 cm (62\")   Wt 103 kg (228 lb)   LMP 2024   SpO2 99%   BMI 41.70 kg/m²     Reason for test:  pelvic pain  Date of Test: 2025  Time frame of test: 4315-1900  RN NST Interpretation:  reactive for gestational age.      Ev Velez RN  2025  23:20 EDT  "

## 2025-07-19 NOTE — NURSING NOTE
32.3 weeks presents via EMS transport with c/o Bigelow Nichols one to two in a day over the last two days and constant pelvic pain that got worse when she arrived at work at [employer] at 1900 tonight, pt. Denies leakage of fluid or vaginal bleeding, states +FM noted, states she has slept today and hadn't drink any water until 1830, states this pregnancy has been complicated due to GDM that is diet controlled, and her RC/S is scheduled with  25, no ctx: noted on monitor, abd soft, non-tender with palpation, states pelvic pain improves when she opens her legs wider, pt. Provided large pitcher of water,  at nurses station informed of the above, monitor strip viewed, orders received to continue PO hydration.

## 2025-07-19 NOTE — H&P
"[facility]  Obstetric History and Physical    Chief Complaint   Patient presents with    Pelvic Pain       Subjective     PNC provided by:  BRYAN    HPI:    Patient is a 25 y.o. 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 But was to uncomfortable/could not relax enough to complete  the act -\" IT felt tight\"  My hips and inner thighs are sore\" She was tired-slept through the day and ate a full meal at 6 pm with a little water- Upon arrival to work - She felt sore, she had a single uncomfortable Jessamine Nichols-   So came in via EMS   Her prenatal care is complicated by  Gestational DM .. Her previous obstetric/gynecological history is noted for previous csect.    The following portions of the patients history were reviewed and updated as appropriate:   current medications, allergies, past medical history, past surgical history, past family history, past social history and current problem list.     Prenatal Information:  Prenatal Results       Initial Prenatal Labs       Test Value Reference Range Date Time    Hemoglobin  11.4 g/dL 12.0 - 15.9 25 1514    Hematocrit  34.3 % 34.0 - 46.6 25 1514    Platelets  295 10*3/mm3 140 - 450 25 1514    Rubella IgG  10.50 index Immune >0.99 25 1514    Hepatitis B SAg  Non-Reactive  Non-Reactive 25 1514    Hepatitis C Ab  Non-Reactive  Non-Reactive 25 1514    RPR  Non-Reactive  Non-Reactive 25 1514    T. Pallidum Ab         ABO  B   25 1514    Rh  Positive   25 1514    Antibody Screen  Negative   25 1514    HIV  Non-Reactive  Non-Reactive 25 1514    Urine Culture  No growth   25 1508    Gonorrhea  Not Detected  Not Detected  25 1508    Chlamydia  Not Detected  Not Detected  25 1508    TSH        HgB A1c         Varicella " IgG        Hemoglobinopathy Fractionation  Comment   03/31/25 1514    Hemoglobinopathy (genetic testing)        Cystic fibrosis         Spinal muscular atrophy        Fragile X                  Fetal testing        Test Value Reference Range Date Time    NIPT        MSAFP        AFP-4                  2nd and 3rd Trimester       Test Value Reference Range Date Time    Hemoglobin (repeated)  11.5 g/dL 12.0 - 15.9 05/28/25 1146    Hematocrit (repeated)  35.1 % 34.0 - 46.6 05/28/25 1146    Platelets   271 10*3/mm3 140 - 450 05/28/25 1146       295 10*3/mm3 140 - 450 03/31/25 1514    1 hour GTT   146 mg/dL 65 - 139 05/28/25 1146    Antibody Screen (repeated)        3rd TM syphilis scrn (repeated)  RPR         3rd TM syphilis scrn (repeated) TP-Ab        3rd TM syphilis screen TB-Ab (FTA)        Syphilis cascade test TP-Ab (EIA)        Syphilis cascade TPPA        GTT Fasting  99 mg/dL 65 - 94 06/05/25 0804    GTT 1 Hr  180 mg/dL 65 - 179 06/05/25 0905    GTT 2 Hr  140 mg/dL 65 - 154 06/05/25 1005    GTT 3 Hr  94 mg/dL 65 - 139 06/05/25 1103    Group B Strep                  Other testing        Test Value Reference Range Date Time    Parvo IgG         CMV IgG                   Drug Screening       Test Value Reference Range Date Time    Amphetamine Screen  Negative  Negative 03/31/25 1508    Barbiturate Screen  Negative  Negative 03/31/25 1508    Benzodiazepine Screen  Negative  Negative 03/31/25 1508    Methadone Screen  Negative  Negative 03/31/25 1508    Phencyclidine Screen  Negative  Negative 03/31/25 1508    Opiates Screen  Negative  Negative 03/31/25 1508    THC Screen  Positive  Negative 03/31/25 1508    Cocaine Screen  Negative  Negative 03/31/25 1508    Propoxyphene Screen        Buprenorphine Screen  Negative  Negative 03/31/25 1508    Methamphetamine Screen  Negative  Negative 03/31/25 1508    Oxycodone Screen  Negative  Negative 03/31/25 1508    Tricyclic Antidepressants Screen  Negative  Negative 03/31/25  1508              Legend    ^: Historical                          External Prenatal Results       Pregnancy Outside Results - Transcribed From Office Records - See Scanned Records For Details       Test Value Date Time    ABO  B  03/31/25 1514    Rh  Positive  03/31/25 1514    Antibody Screen  Negative  03/31/25 1514    Varicella IgG       Rubella  10.50 index 03/31/25 1514    Hgb  11.5 g/dL 05/28/25 1146       11.4 g/dL 03/31/25 1514    Hct  35.1 % 05/28/25 1146       34.3 % 03/31/25 1514    HgB A1c        1h GTT  146 mg/dL 05/28/25 1146    3h GTT Fasting  99 mg/dL 06/05/25 0804    3h GTT 1 hour  180 mg/dL 06/05/25 0905    3h GTT 2 hour  140 mg/dL 06/05/25 1005    3h GTT 3 hour   94 mg/dL 06/05/25 1103    Gonorrhea (discrete)  Not Detected  03/31/25 1508    Chlamydia (discrete)  Not Detected  03/31/25 1508    RPR  Non-Reactive  03/31/25 1514    Syphils cascade: TP-Ab (FTA)       TP-Ab       TP-Ab (EIA)       TPPA       HBsAg  Non-Reactive  03/31/25 1514    Herpes Simplex Virus PCR       Herpes Simplex VIrus Culture       HIV  Non-Reactive  03/31/25 1514    Hep C RNA Quant PCR       Hep C Antibody  Non-Reactive  03/31/25 1514    AFP       NIPT       Cystic Fibrosis (Nicky)       Cystic Fibroisis        Spinal Muscular atrophy       Fragile X       Group B Strep       GBS Susceptibility to Clindamycin       GBS Susceptibility to Erythromycin       Fetal Fibronectin       Genetic Testing, Maternal Blood                 Drug Screening       Test Value Date Time    Urine Drug Screen       Amphetamine Screen  Negative  03/31/25 1508    Barbiturate Screen  Negative  03/31/25 1508    Benzodiazepine Screen  Negative  03/31/25 1508    Methadone Screen  Negative  03/31/25 1508    Phencyclidine Screen  Negative  03/31/25 1508    Opiates Screen  Negative  03/31/25 1508    THC Screen  Positive  03/31/25 1508    Cocaine Screen       Propoxyphene Screen       Buprenorphine Screen  Negative  03/31/25 1508    Methamphetamine  Screen       Oxycodone Screen  Negative  25 1508    Tricyclic Antidepressants Screen  Negative  25 1508              Legend    ^: Historical                             Problem List:     Patient Active Problem List   Diagnosis    Supervision of other normal pregnancy, antepartum    Previous  section    Obesity affecting pregnancy, antepartum    GDM (gestational diabetes mellitus), class A1        Past OB History:     OB History    Para Term  AB Living   3 2 2 0 0 2   SAB IAB Ectopic Molar Multiple Live Births   0 0 0 0 0 2      # Outcome Date GA Lbr Jayson/2nd Weight Sex Type Anes PTL Lv   3 Current            2 Term 02/10/22 39w4d  4010 g (8 lb 13.5 oz) M CS-LTranv Spinal N VERA      Name: SUSSY MARTINEZ      Apgar1: 9  Apgar5: 9   1 Term 20 40w4d  3544 g (7 lb 13 oz) F CS-LTranv  N VERA      Complications: Fetal Intolerance      Obstetric Comments   Menarche at 16, regular, 5-7 days       Past Medical History: Past Medical History:   Diagnosis Date    Abnormal glucose complicating pregnancy     DELIVERY;PUERPERIUM    Acute vaginitis     Chlamydia     Chlamydial infection, unspecified     GDM (gestational diabetes mellitus), class A1 2025    Gonococcal infection, unspecified     Vitamin D deficiency, unspecified       Past Surgical History Past Surgical History:   Procedure Laterality Date     SECTION       SECTION N/A 2/10/2022    Procedure:  SECTION REPEAT;  Surgeon: Janak Gross MD;  Location: [facility];  Service: Gynecology;  Laterality: N/A;      Family History: Family History   Problem Relation Age of Onset    Breast cancer Paternal Grandmother     Ovarian cancer Paternal Aunt       Social History:  reports that she has never smoked. She has never used smokeless tobacco.   reports that she does not currently use alcohol.   reports no history of drug use.        General ROS: Pertinent items are noted in  "HPI        Home Medications:  freestyle, glucose blood, glucose monitor, and prenatal vitamin    Allergies:  No Known Allergies    Objective       Vital Signs Range for the last 24 hours  Temperature: Temp:  [98.7 °F (37.1 °C)] 98.7 °F (37.1 °C)   Temp Source: Temp src: Oral   BP: BP: (113)/(65) 113/65   Pulse: Heart Rate:  [113] 113   Respirations: Resp:  [20] 20   SPO2: SpO2:  [99 %] 99 %     Physical Examination:   General appearance - alert, well appearing, and in no distress  Mental status - alert, oriented to person, place, and time  Chest - clear to auscultation  Heart - normal rate, regular rhythm,  Abdomen - soft, nontender, nondistended,   Pelvic - normal external genitalia, vulva, vagina, cervix, and uterus   Back exam - full range of motion, no tenderness or pain on motion  Neurological - alert, oriented, normal speech  Extremities - peripheral pulses normal  Skin - normal coloration and turgor, no suspicious skin lesions noted    Presentation: N/A, deferred   Cervix:     Dilation:     Effacement:     Station:         Fetal Heart Rate Assessment - CAT I tracing  Method:     Beats/min:     Baseline:     Variability:     Accels:     Decels:     Tracing Category:       Uterine Assessment   Method:     Frequency (min):     Ctx Count in 10 min:NO CTXS     Duration:     Intensity:     [place] Units:       Lab Results (last 24 hours)       ** No results found for the last 24 hours. **             GBS is unknown     Assessment & Plan       * No active hospital problems. *        Assessment:  1.  Intrauterine pregnancy at 32w3d gestation with reactive fetal status.    2.  Labor check - is not in labor  3.  Obstetrical history significant for term pregnancies, previous csect times 2.  4.  GBS status: No results found for: \"STREPGPB\"    Plan:  1. Discharge to home.  - NO CTXS, is reassured , is po hydrated , and given a work note for today -- restorative rest.   2.  Plan of care has been reviewed with patient " and patient agrees.   3.  Risks, benefits of treatment plan have been discussed.  4.  All questions have been answered.        Electronically signed by Zena Bajwa MD, 07/18/25, 8:58 PM EDT.

## 2025-07-19 NOTE — NURSING NOTE
Pt. D/c home in stable condition, PTL precautions give, home and follow up care instructions, pt. Voiced understanding, pt. Amb. To awaiting car without distress noted, s/o at side.

## 2025-07-23 ENCOUNTER — ROUTINE PRENATAL (OUTPATIENT)
Dept: OBSTETRICS AND GYNECOLOGY | Age: 25
End: 2025-07-23
Payer: COMMERCIAL

## 2025-07-23 VITALS — SYSTOLIC BLOOD PRESSURE: 91 MMHG | BODY MASS INDEX: 40.79 KG/M2 | DIASTOLIC BLOOD PRESSURE: 65 MMHG | WEIGHT: 223 LBS

## 2025-07-23 DIAGNOSIS — R42 POSTURAL DIZZINESS WITH PRESYNCOPE: ICD-10-CM

## 2025-07-23 DIAGNOSIS — O99.210 OBESITY AFFECTING PREGNANCY, ANTEPARTUM, UNSPECIFIED OBESITY TYPE: ICD-10-CM

## 2025-07-23 DIAGNOSIS — O24.410 GDM (GESTATIONAL DIABETES MELLITUS), CLASS A1: ICD-10-CM

## 2025-07-23 DIAGNOSIS — Z34.80 SUPERVISION OF OTHER NORMAL PREGNANCY, ANTEPARTUM: Primary | ICD-10-CM

## 2025-07-23 DIAGNOSIS — O24.419 GDM, CLASS A2: ICD-10-CM

## 2025-07-23 DIAGNOSIS — R55 POSTURAL DIZZINESS WITH PRESYNCOPE: ICD-10-CM

## 2025-07-23 DIAGNOSIS — Z98.891 PREVIOUS CESAREAN SECTION: ICD-10-CM

## 2025-07-23 PROBLEM — Z3A.32 32 WEEKS GESTATION OF PREGNANCY: Status: RESOLVED | Noted: 2025-07-18 | Resolved: 2025-07-23

## 2025-07-23 LAB
DEPRECATED RDW RBC AUTO: 43 FL (ref 37–54)
ERYTHROCYTE [DISTWIDTH] IN BLOOD BY AUTOMATED COUNT: 15 % (ref 12.3–15.4)
FERRITIN SERPL-MCNC: 71.8 NG/ML (ref 13–150)
FOLATE SERPL-MCNC: 14.1 NG/ML (ref 4.78–24.2)
GLUCOSE UR STRIP-MCNC: NEGATIVE MG/DL
HCT VFR BLD AUTO: 36.8 % (ref 34–46.6)
HGB BLD-MCNC: 11.8 G/DL (ref 12–15.9)
IRON 24H UR-MRATE: 55 MCG/DL (ref 37–145)
IRON SATN MFR SERPL: 9 % (ref 20–50)
MCH RBC QN AUTO: 25.7 PG (ref 26.6–33)
MCHC RBC AUTO-ENTMCNC: 32.1 G/DL (ref 31.5–35.7)
MCV RBC AUTO: 80 FL (ref 79–97)
PLATELET # BLD AUTO: 281 10*3/MM3 (ref 140–450)
PMV BLD AUTO: 10.6 FL (ref 6–12)
PROT UR STRIP-MCNC: ABNORMAL MG/DL
RBC # BLD AUTO: 4.6 10*6/MM3 (ref 3.77–5.28)
RETICS # AUTO: 0.09 10*6/MM3 (ref 0.02–0.13)
RETICS/RBC NFR AUTO: 1.86 % (ref 0.7–1.9)
TIBC SERPL-MCNC: 597 MCG/DL (ref 298–536)
TRANSFERRIN SERPL-MCNC: 401 MG/DL (ref 200–360)
VIT B12 BLD-MCNC: 322 PG/ML (ref 211–946)
WBC NRBC COR # BLD AUTO: 19.71 10*3/MM3 (ref 3.4–10.8)

## 2025-07-23 PROCEDURE — 82607 VITAMIN B-12: CPT | Performed by: OBSTETRICS & GYNECOLOGY

## 2025-07-23 PROCEDURE — 85045 AUTOMATED RETICULOCYTE COUNT: CPT | Performed by: OBSTETRICS & GYNECOLOGY

## 2025-07-23 PROCEDURE — 84466 ASSAY OF TRANSFERRIN: CPT | Performed by: OBSTETRICS & GYNECOLOGY

## 2025-07-23 PROCEDURE — 83540 ASSAY OF IRON: CPT | Performed by: OBSTETRICS & GYNECOLOGY

## 2025-07-23 PROCEDURE — 80053 COMPREHEN METABOLIC PANEL: CPT | Performed by: OBSTETRICS & GYNECOLOGY

## 2025-07-23 PROCEDURE — 82746 ASSAY OF FOLIC ACID SERUM: CPT | Performed by: OBSTETRICS & GYNECOLOGY

## 2025-07-23 PROCEDURE — 82728 ASSAY OF FERRITIN: CPT | Performed by: OBSTETRICS & GYNECOLOGY

## 2025-07-23 PROCEDURE — 85027 COMPLETE CBC AUTOMATED: CPT | Performed by: OBSTETRICS & GYNECOLOGY

## 2025-07-23 RX ORDER — NEEDLES, DISPOSABLE 25GX5/8"
NEEDLE, DISPOSABLE MISCELLANEOUS
Qty: 50 EACH | Refills: 2 | Status: SHIPPED | OUTPATIENT
Start: 2025-07-23

## 2025-07-23 RX ORDER — INSULIN GLARGINE 100 [IU]/ML
25 INJECTION, SOLUTION SUBCUTANEOUS NIGHTLY
Qty: 3 ML | Refills: 3 | Status: SHIPPED | OUTPATIENT
Start: 2025-07-23

## 2025-07-23 RX ORDER — BLOOD-GLUCOSE METER
KIT MISCELLANEOUS
Qty: 1 EACH | Refills: 0 | Status: SHIPPED | OUTPATIENT
Start: 2025-07-23

## 2025-07-23 NOTE — PROGRESS NOTES
[facility]  OB Follow Up Visit    CC: Routine obstetrical visit    Prenatal care complicated by:  Patient Active Problem List   Diagnosis    Supervision of other normal pregnancy, antepartum    Previous  section    Obesity affecting pregnancy, antepartum    GDM, class A2    Postural dizziness with presyncope     Subjective:   Meliton Pepe is a 25 y.o.  33w1d patient being seen today for her obstetrical follow up visit. The patient has: No complaints, No leaking fluid, No vaginal bleeding, Adequate FM  Reports occasional contractions    History: Past medical and surgical history, medications, allergies, social history, and obstetrical history all reviewed and updated.    Objective:    Urine glucose/protein - See OB flow sheet      BP 91/65   Wt 101 kg (223 lb)   LMP 2024   BMI 40.79 kg/m²     General exam: Comfortable, NAD  FHR: 154 BPM   Uterine Size: 34 cm  Pelvic Exam: No    Assessment and Plan:  Diagnoses and all orders for this visit:    1. Supervision of other normal pregnancy, antepartum (Primary)  Overview:  PINEDA finalized: 25    Optional testing NIPS,CF/SMA,AFP:  declines    COVID vaccine: declines  Flu vaccine: Recommended  Tdap vaccine: Recommended    Anatomy US: 25 normal anatomy except limited cardiac views, anterior placenta, EFW 34%, AC 37%.          Assessment & Plan:  Continue prenatal vitamins  Fetal kick counts   labor warnings    Orders:  -     POC Urinalysis Dipstick    2. GDM (gestational diabetes mellitus), class A1  Assessment & Plan:  The patient did not bring her blood sugar log for review but reports she is checking her blood sugars.  She reports that her fastings are typically around 110.  She reports that her after meals are mostly normal but she does have some 140s and 150s.  Given the elevations of her fastings and elevations in 2-hour postprandials I recommended treatment with insulin.  Because I do not have a log to review I  "would recommend starting with a single dose of Lantus before bed at night.  Will start 25 units before bed each night.  Instructions on injections given.  Hypoglycemia protocol reviewed.  The patient is to continue to panel her blood sugars 4 times daily.  Recommend NSTs.    Orders:  -     Insulin Glargine (BASAGLAR KWIKPEN) 100 UNIT/ML injection pen; Inject 25 Units under the skin into the appropriate area as directed Every Night.  Dispense: 3 mL; Refill: 3  -     Needle, Disp, (BD Disp Needles) 27G X 1/2\" misc; Use to inject insulin subcutaneously daily  Dispense: 50 each; Refill: 2  -     glucose monitor monitoring kit; Use to check blood sugar 4 times daily including fasting and 2 hours after each meal  Dispense: 1 each; Refill: 0  -     Fetal Nonstress Test; Standing    3. Obesity affecting pregnancy, antepartum, unspecified obesity type  Overview:  Plan NSTs at 32-36 weeks      4. Previous  section  Overview:  Previous C/S times 2, planning repeat for delivery  Placenta: Anterior        5. Postural dizziness with presyncope  -     CBC (No Diff)  -     Comprehensive Metabolic Panel  -     Folate  -     Vitamin B12  -     Ferritin  -     Iron Profile  -     Reticulocytes    6. GDM, class A2  Assessment & Plan:  The patient did not bring her blood sugar log for review but reports she is checking her blood sugars.  She reports that her fastings are typically around 110.  She reports that her after meals are mostly normal but she does have some 140s and 150s.  Given the elevations of her fastings and elevations in 2-hour postprandials I recommended treatment with insulin.  Because I do not have a log to review I would recommend starting with a single dose of Lantus before bed at night.  Will start 25 units before bed each night.  Instructions on injections given.  Hypoglycemia protocol reviewed.  The patient is to continue to panel her blood sugars 4 times daily.  Recommend NSTs.        33w1d  Reassuring " pregnancy progress    Counseling: OB precautions, leaking, VB, curry bermeo vs PTL/Labor  [facility]    Questions answered    Return in about 1 week (around 7/30/2025) for Recheck.    Janak Gross MD  07/23/2025

## 2025-07-24 LAB
ALBUMIN SERPL-MCNC: 3.6 G/DL (ref 3.5–5.2)
ALBUMIN/GLOB SERPL: 1 G/DL
ALP SERPL-CCNC: 98 U/L (ref 39–117)
ALT SERPL W P-5'-P-CCNC: 10 U/L (ref 1–33)
ANION GAP SERPL CALCULATED.3IONS-SCNC: 13.2 MMOL/L (ref 5–15)
AST SERPL-CCNC: 17 U/L (ref 1–32)
BILIRUB SERPL-MCNC: <0.2 MG/DL (ref 0–1.2)
BUN SERPL-MCNC: 7 MG/DL (ref 6–20)
BUN/CREAT SERPL: 10.3 (ref 7–25)
CALCIUM SPEC-SCNC: 10 MG/DL (ref 8.6–10.5)
CHLORIDE SERPL-SCNC: 104 MMOL/L (ref 98–107)
CO2 SERPL-SCNC: 16.8 MMOL/L (ref 22–29)
CREAT SERPL-MCNC: 0.68 MG/DL (ref 0.57–1)
EGFRCR SERPLBLD CKD-EPI 2021: 124.1 ML/MIN/1.73
GLOBULIN UR ELPH-MCNC: 3.6 GM/DL
GLUCOSE SERPL-MCNC: 110 MG/DL (ref 65–99)
POTASSIUM SERPL-SCNC: 3.9 MMOL/L (ref 3.5–5.2)
PROT SERPL-MCNC: 7.2 G/DL (ref 6–8.5)
SODIUM SERPL-SCNC: 134 MMOL/L (ref 136–145)

## 2025-07-25 ENCOUNTER — HOSPITAL ENCOUNTER (OUTPATIENT)
Facility: HOSPITAL | Age: 25
Setting detail: SURGERY ADMIT
Discharge: HOME | End: 2025-07-25
Attending: OBSTETRICS & GYNECOLOGY | Admitting: OBSTETRICS & GYNECOLOGY
Payer: COMMERCIAL

## 2025-07-25 VITALS — SYSTOLIC BLOOD PRESSURE: 105 MMHG | RESPIRATION RATE: 18 BRPM | DIASTOLIC BLOOD PRESSURE: 66 MMHG | HEART RATE: 102 BPM

## 2025-07-25 PROBLEM — R10.2 PELVIC PAIN IN ANTEPARTUM PERIOD IN THIRD TRIMESTER: Status: RESOLVED | Noted: 2025-07-18 | Resolved: 2025-07-25

## 2025-07-25 PROBLEM — O24.419 GDM, CLASS A2: Status: ACTIVE | Noted: 2025-07-01

## 2025-07-25 PROBLEM — O26.893 PELVIC PAIN IN ANTEPARTUM PERIOD IN THIRD TRIMESTER: Status: RESOLVED | Noted: 2025-07-18 | Resolved: 2025-07-25

## 2025-07-25 PROCEDURE — 59025 FETAL NON-STRESS TEST: CPT | Performed by: OBSTETRICS & GYNECOLOGY

## 2025-07-25 PROCEDURE — 59025 FETAL NON-STRESS TEST: CPT

## 2025-07-25 NOTE — NON STRESS TEST
Obstetrical Non-stress Test Interpretation     Name:  Meliton Pepe  MRN: [id number]    25 y.o. female  at 33w3d    Indication: GDM       Fetal Movement: active  Fetal HR Assessment Method: external  Fetal HR (beats/min): 140  Fetal HR Baseline: normal range  Fetal HR Variability: moderate (amplitude range 6 to 25 bpm)  Fetal HR Accelerations: greater than/equal to 15 bpm, lasting at least 15 seconds  Fetal HR Decelerations: absent  Sinusoidal Pattern Present: absent    /66 (BP Location: Right arm, Patient Position: Sitting)   Pulse 102   Resp 18   LMP 2024     Reason for test: Diabetes (gestational)  Date of Test: 2025  Time frame of test:   RN NST Interpretation: Reactive      Loretta Saleem RN  2025  09:42 EDT

## 2025-07-26 NOTE — NON STRESS TEST
[facility]   OB NST Note    2025   Name:  Meliton Pepe  MRN: [id number]    Subjective:  25 y.o.  at 33w3d    Indication: GDM    NST:   Baseline: 140  Variability:   Moderate/Normal (amplitude 6-25 bpm)  Accelerations: Present (32 weeks+) 15 x 15 bpm  Decelerations: Absent   Contractions:  Absent    NST interpretation: reactive    Documented Vitals    25 0932   BP: 105/66   Pulse: 102   Resp: 18         Lab Results (last 24 hours)       ** No results found for the last 24 hours. **           Assessment:  25 y.o.  AT 33w3d  GDM    Plan:   OB Precautions, FKC, Keep scheduled NSTs, Keep office visit      Electronically signed by Janak Gross MD, 25, 9:23 PM EDT.

## 2025-07-26 NOTE — ASSESSMENT & PLAN NOTE
The patient did not bring her blood sugar log for review but reports she is checking her blood sugars.  She reports that her fastings are typically around 110.  She reports that her after meals are mostly normal but she does have some 140s and 150s.  Given the elevations of her fastings and elevations in 2-hour postprandials I recommended treatment with insulin.  Because I do not have a log to review I would recommend starting with a single dose of Lantus before bed at night.  Will start 25 units before bed each night.  Instructions on injections given.  Hypoglycemia protocol reviewed.  The patient is to continue to panel her blood sugars 4 times daily.  Recommend NSTs.

## 2025-07-30 ENCOUNTER — ROUTINE PRENATAL (OUTPATIENT)
Dept: OBSTETRICS AND GYNECOLOGY | Age: 25
End: 2025-07-30
Payer: COMMERCIAL

## 2025-07-30 VITALS — BODY MASS INDEX: 41.52 KG/M2 | WEIGHT: 227 LBS | SYSTOLIC BLOOD PRESSURE: 92 MMHG | DIASTOLIC BLOOD PRESSURE: 65 MMHG

## 2025-07-30 DIAGNOSIS — R55 POSTURAL DIZZINESS WITH PRESYNCOPE: ICD-10-CM

## 2025-07-30 DIAGNOSIS — R42 POSTURAL DIZZINESS WITH PRESYNCOPE: ICD-10-CM

## 2025-07-30 DIAGNOSIS — Z34.80 SUPERVISION OF OTHER NORMAL PREGNANCY, ANTEPARTUM: Primary | ICD-10-CM

## 2025-07-30 DIAGNOSIS — O24.410 GDM (GESTATIONAL DIABETES MELLITUS), CLASS A1: ICD-10-CM

## 2025-07-30 DIAGNOSIS — O99.210 OBESITY AFFECTING PREGNANCY, ANTEPARTUM, UNSPECIFIED OBESITY TYPE: ICD-10-CM

## 2025-07-30 DIAGNOSIS — Z98.891 PREVIOUS CESAREAN SECTION: ICD-10-CM

## 2025-07-30 DIAGNOSIS — O24.419 GDM, CLASS A2: ICD-10-CM

## 2025-07-30 LAB
GLUCOSE UR STRIP-MCNC: NEGATIVE MG/DL
PROT UR STRIP-MCNC: NEGATIVE MG/DL

## 2025-07-30 RX ORDER — INSULIN GLARGINE 100 [IU]/ML
25 INJECTION, SOLUTION SUBCUTANEOUS NIGHTLY
Qty: 3 ML | Refills: 3 | Status: SHIPPED | OUTPATIENT
Start: 2025-07-30

## 2025-07-30 RX ORDER — BLOOD-GLUCOSE METER
KIT MISCELLANEOUS
Qty: 1 EACH | Refills: 0 | Status: SHIPPED | OUTPATIENT
Start: 2025-07-30

## 2025-07-30 NOTE — PROGRESS NOTES
[facility]  OB Follow Up Visit    CC: Routine obstetrical visit    Prenatal care complicated by:  Patient Active Problem List   Diagnosis    Supervision of other normal pregnancy, antepartum    Previous  section    Obesity affecting pregnancy, antepartum    GDM, class A2    Postural dizziness with presyncope     Subjective:   Meliton Pepe is a 25 y.o.  34w1d patient being seen today for her obstetrical follow up visit. The patient has: No complaints, No leaking fluid, No vaginal bleeding, Adequate FM  Reports irregular contractions    History: Past medical and surgical history, medications, allergies, social history, and obstetrical history all reviewed and updated.    Objective:    Urine glucose/protein - See OB flow sheet      BP 92/65   Wt 103 kg (227 lb)   LMP 2024   BMI 41.52 kg/m²     General exam: Comfortable, NAD  FHR: 165 BPM   Uterine Size: 35 cm  Pelvic Exam: No    Assessment and Plan:  Diagnoses and all orders for this visit:    1. Supervision of other normal pregnancy, antepartum (Primary)  Overview:  PINEDA finalized: 25    Optional testing NIPS,CF/SMA,AFP:  declines    COVID vaccine: declines  Flu vaccine: Recommended  Tdap vaccine: Recommended    Anatomy US: 25 normal anatomy except limited cardiac views, anterior placenta, EFW 34%, AC 37%.          Orders:  -     POC Urinalysis Dipstick    2. GDM (gestational diabetes mellitus), class A1  -     glucose monitor monitoring kit; Use to check blood glucose 4x daily including fasting and 2 hours after each meal  Dispense: 1 each; Refill: 0  -     Insulin Glargine (BASAGLAR KWIKPEN) 100 UNIT/ML injection pen; Inject 25 Units under the skin into the appropriate area as directed Every Night.  Dispense: 3 mL; Refill: 3    3. Previous  section  Overview:  Previous C/S times 2, planning repeat for delivery  Placenta: Anterior        4. Obesity affecting pregnancy, antepartum, unspecified obesity  type  Overview:  Plan NSTs at 32-36 weeks    Assessment & Plan:  Previously counseled.      5. GDM, class A2  Assessment & Plan:  The patient once again does not have a blood sugar log today.  She reports that she still has some elevations in her blood sugars.  She states she is still concerned her meter is not working right and the pharmacy did not have 1.  She also states that they did not have the prescription for her insulin despite me sending it last time.  I again have urged the patient to bring her blood sugar logs to each of her office visits.  We have discussed the importance of being able to review those logs to make decisions on medications.  I have resent a prescription for her glucose monitor and Basaglar long-acting insulin to the pharmacy and have urged her to go pick that up as soon as possible.  I have also informed the patient of the pharmacy says they do not have the prescription to notify me ASAP as we may have to call that prescription in or send it to a different pharmacy.  We have discussed the risks to the fetus with uncontrolled or poorly controlled diabetes, including the risk of stillbirth.  Today's growth ultrasound was reviewed.  The growth is appropriate.  YEE is normal.  Cephalic presentation confirmed.  Continue NST's.      6. Postural dizziness with presyncope      34w1d  Reassuring pregnancy progress    Counseling: OB precautions, leaking, VB, curry bermeo vs PTL/Labor  [facility]    Questions answered    Return in about 2 weeks (around 8/13/2025) for Recheck.    Janak Gross MD  07/30/2025

## 2025-07-31 NOTE — ASSESSMENT & PLAN NOTE
The patient once again does not have a blood sugar log today.  She reports that she still has some elevations in her blood sugars.  She states she is still concerned her meter is not working right and the pharmacy did not have 1.  She also states that they did not have the prescription for her insulin despite me sending it last time.  I again have urged the patient to bring her blood sugar logs to each of her office visits.  We have discussed the importance of being able to review those logs to make decisions on medications.  I have resent a prescription for her glucose monitor and Basaglar long-acting insulin to the pharmacy and have urged her to go pick that up as soon as possible.  I have also informed the patient of the pharmacy says they do not have the prescription to notify me ASAP as we may have to call that prescription in or send it to a different pharmacy.  We have discussed the risks to the fetus with uncontrolled or poorly controlled diabetes, including the risk of stillbirth.  Today's growth ultrasound was reviewed.  The growth is appropriate.  YEE is normal.  Cephalic presentation confirmed.  Continue NST's.

## 2025-08-01 ENCOUNTER — HOSPITAL ENCOUNTER (OUTPATIENT)
Dept: LABOR AND DELIVERY | Facility: HOSPITAL | Age: 25
Discharge: HOME | End: 2025-08-01
Payer: COMMERCIAL

## 2025-08-01 ENCOUNTER — HOSPITAL ENCOUNTER (OUTPATIENT)
Facility: HOSPITAL | Age: 25
Setting detail: SURGERY ADMIT
Discharge: HOME | End: 2025-08-01
Attending: STUDENT IN AN ORGANIZED HEALTH CARE EDUCATION/TRAINING PROGRAM | Admitting: STUDENT IN AN ORGANIZED HEALTH CARE EDUCATION/TRAINING PROGRAM
Payer: COMMERCIAL

## 2025-08-01 VITALS — DIASTOLIC BLOOD PRESSURE: 63 MMHG | HEART RATE: 114 BPM | SYSTOLIC BLOOD PRESSURE: 109 MMHG

## 2025-08-01 PROCEDURE — 59025 FETAL NON-STRESS TEST: CPT

## 2025-08-01 NOTE — NON STRESS TEST
Obstetrical Non-stress Test Interpretation     Name:  Meliton Pepe  MRN: [id number]    25 y.o. female  at 34w3d    Indication: GDM      Fetal Assessment  Fetal Movement: active  Fetal HR Assessment Method: external  Fetal HR (beats/min): 145  Fetal HR Baseline: normal range  Fetal HR Variability: moderate (amplitude range 6 to 25 bpm)  Fetal HR Accelerations: greater than/equal to 15 bpm, lasting at least 15 seconds  Fetal HR Decelerations: absent  Sinusoidal Pattern Present: absent    /63   Pulse 114   LMP 2024      Reason for test: Diabetes (gestational)  Date of Test: 2025  Time frame of test:   RN NST Interpretation: Nicola Fontanez RN  2025  09:37 EDT

## 2025-08-08 ENCOUNTER — HOSPITAL ENCOUNTER (OUTPATIENT)
Facility: HOSPITAL | Age: 25
Setting detail: SURGERY ADMIT
Discharge: HOME | End: 2025-08-08
Attending: OBSTETRICS & GYNECOLOGY | Admitting: OBSTETRICS & GYNECOLOGY
Payer: COMMERCIAL

## 2025-08-08 ENCOUNTER — HOSPITAL ENCOUNTER (OUTPATIENT)
Dept: LABOR AND DELIVERY | Facility: HOSPITAL | Age: 25
Discharge: HOME | End: 2025-08-08
Payer: COMMERCIAL

## 2025-08-08 VITALS
DIASTOLIC BLOOD PRESSURE: 65 MMHG | OXYGEN SATURATION: 100 % | HEART RATE: 98 BPM | SYSTOLIC BLOOD PRESSURE: 103 MMHG | RESPIRATION RATE: 18 BRPM

## 2025-08-08 PROCEDURE — 59025 FETAL NON-STRESS TEST: CPT | Performed by: OBSTETRICS & GYNECOLOGY

## 2025-08-08 PROCEDURE — 59025 FETAL NON-STRESS TEST: CPT

## 2025-08-13 ENCOUNTER — ROUTINE PRENATAL (OUTPATIENT)
Dept: OBSTETRICS AND GYNECOLOGY | Age: 25
End: 2025-08-13
Payer: COMMERCIAL

## 2025-08-13 VITALS — SYSTOLIC BLOOD PRESSURE: 94 MMHG | WEIGHT: 225 LBS | BODY MASS INDEX: 41.15 KG/M2 | DIASTOLIC BLOOD PRESSURE: 68 MMHG

## 2025-08-13 DIAGNOSIS — Z34.80 SUPERVISION OF OTHER NORMAL PREGNANCY, ANTEPARTUM: Primary | ICD-10-CM

## 2025-08-13 DIAGNOSIS — Z98.891 PREVIOUS CESAREAN SECTION: ICD-10-CM

## 2025-08-13 DIAGNOSIS — O24.410 GDM (GESTATIONAL DIABETES MELLITUS), CLASS A1: ICD-10-CM

## 2025-08-13 DIAGNOSIS — O99.210 OBESITY AFFECTING PREGNANCY, ANTEPARTUM, UNSPECIFIED OBESITY TYPE: ICD-10-CM

## 2025-08-13 DIAGNOSIS — O24.419 GDM, CLASS A2: ICD-10-CM

## 2025-08-13 PROBLEM — R42 POSTURAL DIZZINESS WITH PRESYNCOPE: Status: RESOLVED | Noted: 2025-07-23 | Resolved: 2025-08-13

## 2025-08-13 PROBLEM — R55 POSTURAL DIZZINESS WITH PRESYNCOPE: Status: RESOLVED | Noted: 2025-07-23 | Resolved: 2025-08-13

## 2025-08-13 LAB
GLUCOSE UR STRIP-MCNC: NEGATIVE MG/DL
PROT UR STRIP-MCNC: NEGATIVE MG/DL

## 2025-08-13 PROCEDURE — 87653 STREP B DNA AMP PROBE: CPT | Performed by: OBSTETRICS & GYNECOLOGY

## 2025-08-13 RX ORDER — INSULIN GLARGINE 100 [IU]/ML
25 INJECTION, SOLUTION SUBCUTANEOUS NIGHTLY
Qty: 3 ML | Refills: 3 | Status: SHIPPED | OUTPATIENT
Start: 2025-08-13

## 2025-08-14 LAB — GP B STREP DNA VAG+RECTUM QL NAA+PROBE: POSITIVE

## 2025-08-15 ENCOUNTER — HOSPITAL ENCOUNTER (OUTPATIENT)
Dept: LABOR AND DELIVERY | Facility: HOSPITAL | Age: 25
Discharge: HOME | End: 2025-08-15
Payer: COMMERCIAL

## 2025-08-15 ENCOUNTER — HOSPITAL ENCOUNTER (OUTPATIENT)
Facility: HOSPITAL | Age: 25
Setting detail: SURGERY ADMIT
Discharge: HOME | End: 2025-08-15
Attending: OBSTETRICS & GYNECOLOGY | Admitting: OBSTETRICS & GYNECOLOGY
Payer: COMMERCIAL

## 2025-08-15 VITALS
SYSTOLIC BLOOD PRESSURE: 115 MMHG | RESPIRATION RATE: 18 BRPM | OXYGEN SATURATION: 100 % | DIASTOLIC BLOOD PRESSURE: 68 MMHG | TEMPERATURE: 98.5 F | HEART RATE: 96 BPM

## 2025-08-15 PROCEDURE — 59025 FETAL NON-STRESS TEST: CPT | Performed by: OBSTETRICS & GYNECOLOGY

## 2025-08-15 PROCEDURE — 59025 FETAL NON-STRESS TEST: CPT

## 2025-08-19 ENCOUNTER — ROUTINE PRENATAL (OUTPATIENT)
Dept: OBSTETRICS AND GYNECOLOGY | Age: 25
End: 2025-08-19
Payer: COMMERCIAL

## 2025-08-19 VITALS — SYSTOLIC BLOOD PRESSURE: 108 MMHG | DIASTOLIC BLOOD PRESSURE: 72 MMHG | BODY MASS INDEX: 41.15 KG/M2 | WEIGHT: 225 LBS

## 2025-08-19 DIAGNOSIS — Z34.80 SUPERVISION OF OTHER NORMAL PREGNANCY, ANTEPARTUM: Primary | ICD-10-CM

## 2025-08-19 DIAGNOSIS — O99.210 OBESITY AFFECTING PREGNANCY, ANTEPARTUM, UNSPECIFIED OBESITY TYPE: ICD-10-CM

## 2025-08-19 DIAGNOSIS — O24.419 GDM, CLASS A2: ICD-10-CM

## 2025-08-19 DIAGNOSIS — Z98.891 PREVIOUS CESAREAN SECTION: ICD-10-CM

## 2025-08-19 LAB
GLUCOSE UR STRIP-MCNC: NEGATIVE MG/DL
PROT UR STRIP-MCNC: NEGATIVE MG/DL

## 2025-08-22 ENCOUNTER — HOSPITAL ENCOUNTER (OUTPATIENT)
Dept: LABOR AND DELIVERY | Facility: HOSPITAL | Age: 25
Discharge: HOME | End: 2025-08-22
Payer: COMMERCIAL

## 2025-08-22 ENCOUNTER — HOSPITAL ENCOUNTER (OUTPATIENT)
Facility: HOSPITAL | Age: 25
Setting detail: SURGERY ADMIT
Discharge: HOME | End: 2025-08-22
Attending: OBSTETRICS & GYNECOLOGY | Admitting: OBSTETRICS & GYNECOLOGY
Payer: COMMERCIAL

## 2025-08-26 ENCOUNTER — ROUTINE PRENATAL (OUTPATIENT)
Dept: OBSTETRICS AND GYNECOLOGY | Age: 25
End: 2025-08-26
Payer: COMMERCIAL

## 2025-08-26 VITALS — DIASTOLIC BLOOD PRESSURE: 69 MMHG | WEIGHT: 228 LBS | SYSTOLIC BLOOD PRESSURE: 106 MMHG | BODY MASS INDEX: 41.7 KG/M2

## 2025-08-26 DIAGNOSIS — O99.210 OBESITY AFFECTING PREGNANCY, ANTEPARTUM, UNSPECIFIED OBESITY TYPE: ICD-10-CM

## 2025-08-26 DIAGNOSIS — Z98.891 PREVIOUS CESAREAN SECTION: ICD-10-CM

## 2025-08-26 DIAGNOSIS — Z34.80 SUPERVISION OF OTHER NORMAL PREGNANCY, ANTEPARTUM: Primary | ICD-10-CM

## 2025-08-26 DIAGNOSIS — O24.419 GDM, CLASS A2: ICD-10-CM

## 2025-08-26 PROBLEM — O34.219 PREVIOUS CESAREAN DELIVERY AFFECTING PREGNANCY: Status: RESOLVED | Noted: 2025-08-26 | Resolved: 2025-08-26

## 2025-08-26 PROBLEM — O34.219 PREVIOUS CESAREAN DELIVERY AFFECTING PREGNANCY: Status: ACTIVE | Noted: 2025-08-26

## 2025-08-26 LAB
GLUCOSE UR STRIP-MCNC: NEGATIVE MG/DL
PROT UR STRIP-MCNC: ABNORMAL MG/DL

## 2025-08-26 RX ORDER — ONDANSETRON 2 MG/ML
4 INJECTION INTRAMUSCULAR; INTRAVENOUS EVERY 6 HOURS PRN
OUTPATIENT
Start: 2025-08-26

## 2025-08-26 RX ORDER — OXYTOCIN/0.9 % SODIUM CHLORIDE 30/500 ML
125 PLASTIC BAG, INJECTION (ML) INTRAVENOUS ONCE
OUTPATIENT
Start: 2025-08-26 | End: 2025-08-26

## 2025-08-26 RX ORDER — SODIUM CHLORIDE 9 MG/ML
40 INJECTION, SOLUTION INTRAVENOUS AS NEEDED
OUTPATIENT
Start: 2025-08-26

## 2025-08-26 RX ORDER — MISOPROSTOL 100 UG/1
800 TABLET ORAL AS NEEDED
OUTPATIENT
Start: 2025-08-26

## 2025-08-26 RX ORDER — LIDOCAINE HYDROCHLORIDE 10 MG/ML
0.5 INJECTION, SOLUTION EPIDURAL; INFILTRATION; INTRACAUDAL; PERINEURAL ONCE AS NEEDED
OUTPATIENT
Start: 2025-08-26

## 2025-08-26 RX ORDER — ACETAMINOPHEN 500 MG
1000 TABLET ORAL ONCE
OUTPATIENT
Start: 2025-08-26 | End: 2025-08-26

## 2025-08-26 RX ORDER — CARBOPROST TROMETHAMINE 250 UG/ML
250 INJECTION, SOLUTION INTRAMUSCULAR AS NEEDED
OUTPATIENT
Start: 2025-08-26

## 2025-08-26 RX ORDER — SODIUM CHLORIDE 0.9 % (FLUSH) 0.9 %
10 SYRINGE (ML) INJECTION EVERY 12 HOURS SCHEDULED
OUTPATIENT
Start: 2025-08-26

## 2025-08-26 RX ORDER — FAMOTIDINE 10 MG
20 TABLET ORAL ONCE AS NEEDED
OUTPATIENT
Start: 2025-08-26

## 2025-08-26 RX ORDER — KETOROLAC TROMETHAMINE 15 MG/ML
30 INJECTION, SOLUTION INTRAMUSCULAR; INTRAVENOUS ONCE
OUTPATIENT
Start: 2025-08-26 | End: 2025-08-26

## 2025-08-26 RX ORDER — METHYLERGONOVINE MALEATE 0.2 MG/ML
200 INJECTION INTRAVENOUS ONCE AS NEEDED
OUTPATIENT
Start: 2025-08-26

## 2025-08-26 RX ORDER — FAMOTIDINE 10 MG/ML
20 INJECTION, SOLUTION INTRAVENOUS ONCE AS NEEDED
OUTPATIENT
Start: 2025-08-26

## 2025-08-26 RX ORDER — CITRIC ACID/SODIUM CITRATE 334-500MG
30 SOLUTION, ORAL ORAL ONCE
OUTPATIENT
Start: 2025-08-26 | End: 2025-08-26

## 2025-08-26 RX ORDER — SODIUM CHLORIDE 0.9 % (FLUSH) 0.9 %
10 SYRINGE (ML) INJECTION AS NEEDED
OUTPATIENT
Start: 2025-08-26

## 2025-08-26 RX ORDER — SODIUM CHLORIDE, SODIUM LACTATE, POTASSIUM CHLORIDE, CALCIUM CHLORIDE 600; 310; 30; 20 MG/100ML; MG/100ML; MG/100ML; MG/100ML
150 INJECTION, SOLUTION INTRAVENOUS CONTINUOUS
OUTPATIENT
Start: 2025-08-26 | End: 2025-08-27

## 2025-08-26 RX ORDER — ONDANSETRON 4 MG/1
4 TABLET, ORALLY DISINTEGRATING ORAL EVERY 6 HOURS PRN
OUTPATIENT
Start: 2025-08-26

## 2025-08-29 ENCOUNTER — HOSPITAL ENCOUNTER (OUTPATIENT)
Facility: HOSPITAL | Age: 25
Setting detail: SURGERY ADMIT
Discharge: HOME | End: 2025-08-29
Attending: OBSTETRICS & GYNECOLOGY | Admitting: OBSTETRICS & GYNECOLOGY
Payer: COMMERCIAL

## 2025-08-29 ENCOUNTER — HOSPITAL ENCOUNTER (OUTPATIENT)
Dept: LABOR AND DELIVERY | Facility: HOSPITAL | Age: 25
Discharge: HOME | End: 2025-08-29
Payer: COMMERCIAL

## 2025-08-29 VITALS
HEART RATE: 101 BPM | RESPIRATION RATE: 18 BRPM | SYSTOLIC BLOOD PRESSURE: 110 MMHG | OXYGEN SATURATION: 97 % | DIASTOLIC BLOOD PRESSURE: 67 MMHG

## 2025-08-29 PROCEDURE — 59025 FETAL NON-STRESS TEST: CPT

## (undated) DEVICE — C SECTION PACK: Brand: MEDLINE INDUSTRIES, INC.

## (undated) DEVICE — SUT MNCRYL 0 CT1 27IN VIL

## (undated) DEVICE — SUT CHRM 0 CT1 36IN 924H

## (undated) DEVICE — SUT MNCRYL 4/0 PS2 18 IN

## (undated) DEVICE — DEV TRANSF BLD W/LUER ADPT CA/198

## (undated) DEVICE — STERILE POLYISOPRENE POWDER-FREE SURGICAL GLOVES WITH EMOLLIENT COATING: Brand: PROTEXIS

## (undated) DEVICE — NEEDLE,18GX1.5",REG,BEVEL: Brand: MEDLINE

## (undated) DEVICE — Device: Brand: PORTEX

## (undated) DEVICE — GLV SURG BIOGEL LTX PF 7

## (undated) DEVICE — SUT VIC 3/0 CTI 36IN J944H

## (undated) DEVICE — TRY CATH FOL ADVANCE SIL W/BAG 16F

## (undated) DEVICE — INTENDED FOR TISSUE SEPARATION, AND OTHER PROCEDURES THAT REQUIRE A SHARP SURGICAL BLADE TO PUNCTURE OR CUT.: Brand: BARD-PARKER ® CARBON RIB-BACK BLADES

## (undated) DEVICE — CVR HNDL LT SURG ACCSSRY BLU STRL

## (undated) DEVICE — PAD GRND REM POLYHESIVE A/ DISP

## (undated) DEVICE — VIOLET BRAIDED (POLYGLACTIN 910), SYNTHETIC ABSORBABLE SUTURE: Brand: COATED VICRYL

## (undated) DEVICE — SUT MNCRYL 0/0 CTX 36IN Y398H